# Patient Record
Sex: FEMALE | Race: OTHER | HISPANIC OR LATINO
[De-identification: names, ages, dates, MRNs, and addresses within clinical notes are randomized per-mention and may not be internally consistent; named-entity substitution may affect disease eponyms.]

---

## 2017-01-10 ENCOUNTER — APPOINTMENT (OUTPATIENT)
Dept: OTOLARYNGOLOGY | Facility: CLINIC | Age: 67
End: 2017-01-10

## 2017-01-10 VITALS
HEIGHT: 60 IN | HEART RATE: 71 BPM | BODY MASS INDEX: 33.38 KG/M2 | WEIGHT: 170 LBS | DIASTOLIC BLOOD PRESSURE: 73 MMHG | SYSTOLIC BLOOD PRESSURE: 121 MMHG

## 2017-01-10 DIAGNOSIS — H90.6 MIXED CONDUCTIVE AND SENSORINEURAL HEARING LOSS, BILATERAL: ICD-10-CM

## 2017-01-10 RX ORDER — LOSARTAN POTASSIUM 50 MG/1
50 TABLET, FILM COATED ORAL
Refills: 0 | Status: ACTIVE | COMMUNITY

## 2017-01-25 ENCOUNTER — APPOINTMENT (OUTPATIENT)
Dept: PHARMACY | Facility: CLINIC | Age: 67
End: 2017-01-25

## 2017-02-21 ENCOUNTER — APPOINTMENT (OUTPATIENT)
Dept: OTOLARYNGOLOGY | Facility: CLINIC | Age: 67
End: 2017-02-21

## 2017-03-13 ENCOUNTER — EMERGENCY (EMERGENCY)
Facility: HOSPITAL | Age: 67
LOS: 1 days | Discharge: HOME | End: 2017-03-13
Attending: EMERGENCY MEDICINE | Admitting: EMERGENCY MEDICINE
Payer: MEDICARE

## 2017-03-13 VITALS
HEART RATE: 73 BPM | DIASTOLIC BLOOD PRESSURE: 62 MMHG | RESPIRATION RATE: 16 BRPM | SYSTOLIC BLOOD PRESSURE: 125 MMHG | TEMPERATURE: 99 F | OXYGEN SATURATION: 100 %

## 2017-03-13 PROCEDURE — 99284 EMERGENCY DEPT VISIT MOD MDM: CPT | Mod: GC

## 2017-03-13 NOTE — ED ADULT TRIAGE NOTE - CHIEF COMPLAINT QUOTE
Pt arrives w/ c/o SOB since last weekend accompanied w/ FERNANDEZ. Provided w/ inhalers last weekend w/ no relief. Respirations appear even and unlabored.

## 2017-03-14 VITALS
OXYGEN SATURATION: 99 % | HEART RATE: 80 BPM | RESPIRATION RATE: 16 BRPM | DIASTOLIC BLOOD PRESSURE: 65 MMHG | SYSTOLIC BLOOD PRESSURE: 121 MMHG | TEMPERATURE: 98 F

## 2017-03-14 LAB
ALBUMIN SERPL ELPH-MCNC: 4.3 G/DL — SIGNIFICANT CHANGE UP (ref 3.3–5)
ALP SERPL-CCNC: 69 U/L — SIGNIFICANT CHANGE UP (ref 40–120)
ALT FLD-CCNC: 30 U/L — SIGNIFICANT CHANGE UP (ref 4–33)
APTT BLD: 25.5 SEC — LOW (ref 27.5–37.4)
AST SERPL-CCNC: 29 U/L — SIGNIFICANT CHANGE UP (ref 4–32)
BASOPHILS # BLD AUTO: 0.01 K/UL — SIGNIFICANT CHANGE UP (ref 0–0.2)
BASOPHILS NFR BLD AUTO: 0.1 % — SIGNIFICANT CHANGE UP (ref 0–2)
BILIRUB SERPL-MCNC: 0.4 MG/DL — SIGNIFICANT CHANGE UP (ref 0.2–1.2)
BUN SERPL-MCNC: 26 MG/DL — HIGH (ref 7–23)
CALCIUM SERPL-MCNC: 9.4 MG/DL — SIGNIFICANT CHANGE UP (ref 8.4–10.5)
CHLORIDE SERPL-SCNC: 102 MMOL/L — SIGNIFICANT CHANGE UP (ref 98–107)
CK MB BLD-MCNC: 2 — SIGNIFICANT CHANGE UP (ref 0–2.5)
CK MB BLD-MCNC: 5.58 NG/ML — HIGH (ref 1–4.7)
CK MB BLD-MCNC: 6.72 NG/ML — HIGH (ref 1–4.7)
CK SERPL-CCNC: 251 U/L — HIGH (ref 25–170)
CK SERPL-CCNC: 330 U/L — HIGH (ref 25–170)
CO2 SERPL-SCNC: 23 MMOL/L — SIGNIFICANT CHANGE UP (ref 22–31)
CREAT SERPL-MCNC: 1.04 MG/DL — SIGNIFICANT CHANGE UP (ref 0.5–1.3)
EOSINOPHIL # BLD AUTO: 0.01 K/UL — SIGNIFICANT CHANGE UP (ref 0–0.5)
EOSINOPHIL NFR BLD AUTO: 0.1 % — SIGNIFICANT CHANGE UP (ref 0–6)
GLUCOSE SERPL-MCNC: 173 MG/DL — HIGH (ref 70–99)
HCT VFR BLD CALC: 38.7 % — SIGNIFICANT CHANGE UP (ref 34.5–45)
HGB BLD-MCNC: 12.8 G/DL — SIGNIFICANT CHANGE UP (ref 11.5–15.5)
IMM GRANULOCYTES NFR BLD AUTO: 0.4 % — SIGNIFICANT CHANGE UP (ref 0–1.5)
INR BLD: 0.98 — SIGNIFICANT CHANGE UP (ref 0.87–1.18)
LYMPHOCYTES # BLD AUTO: 1.49 K/UL — SIGNIFICANT CHANGE UP (ref 1–3.3)
LYMPHOCYTES # BLD AUTO: 14.2 % — SIGNIFICANT CHANGE UP (ref 13–44)
MCHC RBC-ENTMCNC: 29.3 PG — SIGNIFICANT CHANGE UP (ref 27–34)
MCHC RBC-ENTMCNC: 33.1 % — SIGNIFICANT CHANGE UP (ref 32–36)
MCV RBC AUTO: 88.6 FL — SIGNIFICANT CHANGE UP (ref 80–100)
MONOCYTES # BLD AUTO: 0.57 K/UL — SIGNIFICANT CHANGE UP (ref 0–0.9)
MONOCYTES NFR BLD AUTO: 5.4 % — SIGNIFICANT CHANGE UP (ref 2–14)
NEUTROPHILS # BLD AUTO: 8.39 K/UL — HIGH (ref 1.8–7.4)
NEUTROPHILS NFR BLD AUTO: 79.8 % — HIGH (ref 43–77)
PLATELET # BLD AUTO: 219 K/UL — SIGNIFICANT CHANGE UP (ref 150–400)
PMV BLD: 11.6 FL — SIGNIFICANT CHANGE UP (ref 7–13)
POTASSIUM SERPL-MCNC: 3.8 MMOL/L — SIGNIFICANT CHANGE UP (ref 3.5–5.3)
POTASSIUM SERPL-SCNC: 3.8 MMOL/L — SIGNIFICANT CHANGE UP (ref 3.5–5.3)
PROT SERPL-MCNC: 7.1 G/DL — SIGNIFICANT CHANGE UP (ref 6–8.3)
PROTHROM AB SERPL-ACNC: 11.2 SEC — SIGNIFICANT CHANGE UP (ref 10–13.1)
RBC # BLD: 4.37 M/UL — SIGNIFICANT CHANGE UP (ref 3.8–5.2)
RBC # FLD: 14.4 % — SIGNIFICANT CHANGE UP (ref 10.3–14.5)
SODIUM SERPL-SCNC: 142 MMOL/L — SIGNIFICANT CHANGE UP (ref 135–145)
TROPONIN T SERPL-MCNC: < 0.06 NG/ML — SIGNIFICANT CHANGE UP (ref 0–0.06)
TROPONIN T SERPL-MCNC: < 0.06 NG/ML — SIGNIFICANT CHANGE UP (ref 0–0.06)
WBC # BLD: 10.51 K/UL — HIGH (ref 3.8–10.5)
WBC # FLD AUTO: 10.51 K/UL — HIGH (ref 3.8–10.5)

## 2017-03-14 PROCEDURE — 71020: CPT | Mod: 26

## 2017-03-14 RX ORDER — ASPIRIN/CALCIUM CARB/MAGNESIUM 324 MG
325 TABLET ORAL ONCE
Qty: 0 | Refills: 0 | Status: COMPLETED | OUTPATIENT
Start: 2017-03-14 | End: 2017-03-14

## 2017-03-14 RX ORDER — SODIUM CHLORIDE 9 MG/ML
3 INJECTION INTRAMUSCULAR; INTRAVENOUS; SUBCUTANEOUS ONCE
Qty: 0 | Refills: 0 | Status: COMPLETED | OUTPATIENT
Start: 2017-03-14 | End: 2017-03-14

## 2017-03-14 RX ORDER — IPRATROPIUM/ALBUTEROL SULFATE 18-103MCG
3 AEROSOL WITH ADAPTER (GRAM) INHALATION ONCE
Qty: 0 | Refills: 0 | Status: COMPLETED | OUTPATIENT
Start: 2017-03-14 | End: 2017-03-14

## 2017-03-14 RX ADMIN — Medication 80 MILLIGRAM(S): at 02:43

## 2017-03-14 RX ADMIN — Medication 325 MILLIGRAM(S): at 01:16

## 2017-03-14 RX ADMIN — SODIUM CHLORIDE 3 MILLILITER(S): 9 INJECTION INTRAMUSCULAR; INTRAVENOUS; SUBCUTANEOUS at 01:03

## 2017-03-14 RX ADMIN — Medication 3 MILLILITER(S): at 01:16

## 2017-03-14 NOTE — ED PROVIDER NOTE - OBJECTIVE STATEMENT
65 y/o female with PMH of asthma and HTN p/w 3 days of dry cough and SOB. Saw PCP on Saturday was prescribed Advair, prednisone and Advair advised to go to ED if not feeling better. 65 y/o female with PMH of asthma and HTN p/w 3 days of persistent dry cough and SOB. Saw PCP on Saturday was prescribed Advair, prednisone and Advair advised to go to ED if not feeling better. No CP. No fever.

## 2017-03-14 NOTE — ED ADULT NURSE NOTE - OBJECTIVE STATEMENT
Received pt in spot 4. [id number]. C/o left rib pain and sob with dry cough x 1 week. Saw pmd and prescribed medication with no relief. Denies chest pain. NSR on cardiac monitor. IV placed, labs sent. Rpt given to primary RN Srinivas. Will monitor.

## 2017-03-14 NOTE — ED ADULT NURSE REASSESSMENT NOTE - NS ED NURSE REASSESS COMMENT FT1
Patient sleeping in bed and appears to be in no apparent distress.  2nd CE drawn and sent.  Vitals taken and will continue to monitor patient.  Daughter at bedside.

## 2017-03-14 NOTE — ED PROVIDER NOTE - MEDICAL DECISION MAKING DETAILS
h/o asthma p/w cough and dyspnea for 3 days: CXR r/o PNA. Duoneb + Steroid. Check EKG and cardiac enzymes given intermittent right lateral CP. Well's negative for PE.

## 2017-03-15 ENCOUNTER — APPOINTMENT (OUTPATIENT)
Dept: OTOLARYNGOLOGY | Facility: CLINIC | Age: 67
End: 2017-03-15

## 2018-10-07 ENCOUNTER — EMERGENCY (EMERGENCY)
Facility: HOSPITAL | Age: 68
LOS: 1 days | Discharge: HOME | End: 2018-10-07
Attending: EMERGENCY MEDICINE | Admitting: EMERGENCY MEDICINE
Payer: MEDICARE

## 2018-10-07 VITALS
TEMPERATURE: 98 F | RESPIRATION RATE: 16 BRPM | HEART RATE: 69 BPM | SYSTOLIC BLOOD PRESSURE: 136 MMHG | DIASTOLIC BLOOD PRESSURE: 87 MMHG | OXYGEN SATURATION: 100 %

## 2018-10-07 VITALS
OXYGEN SATURATION: 100 % | DIASTOLIC BLOOD PRESSURE: 63 MMHG | RESPIRATION RATE: 16 BRPM | TEMPERATURE: 99 F | SYSTOLIC BLOOD PRESSURE: 110 MMHG | HEART RATE: 72 BPM

## 2018-10-07 PROBLEM — J45.909 UNSPECIFIED ASTHMA, UNCOMPLICATED: Chronic | Status: ACTIVE | Noted: 2017-03-14

## 2018-10-07 PROCEDURE — 99283 EMERGENCY DEPT VISIT LOW MDM: CPT | Mod: GC,25

## 2018-10-07 RX ADMIN — Medication 100 MILLIGRAM(S): at 03:59

## 2018-10-07 RX ADMIN — Medication 200 MILLIGRAM(S): at 04:17

## 2018-10-07 NOTE — ED PROVIDER NOTE - CARE PLAN
Principal Discharge DX:	Cough Principal Discharge DX:	Cough  Assessment and plan of treatment:	1) Please follow-up with your primary care doctor within the next 3 days.  Please call today or tomorrow for an appointment.  If you cannot follow-up with your doctor(s), please return to the ED for any urgent issues.  2) If you have any worsening of symptoms or any other concerns please return to the ED immediately.  3) Please continue taking your home medications as directed.  4) You may have been given a copy of your labs and/or imaging.  Please go over these with your primary care doctor.

## 2018-10-07 NOTE — ED PROVIDER NOTE - CHIEF COMPLAINT
The patient is a 68y Female complaining of asthma exacerbation. The patient is a 68y Female complaining of cough.

## 2018-10-07 NOTE — ED PROVIDER NOTE - PHYSICAL EXAMINATION
Vitals: WNL  Gen: actively coughing, sitting comfortably in NAD  Head: NCAT, no sinus tenderness  ENT: sclerae white, anicterus, moist mucous membranes. No exudates. Neck supple, no LAD, no posterior pharynx erythema  CV: RRR. Audible S1 and S2. No murmurs, rubs, gallops, S3, nor S4,  Pulm: Clear to auscultation bilaterally. No wheezes, rales, or rhonchi  Abd: soft, normoactive BS x4, NTND, no rebound, no guarding, no rashes  Musculoskeletal:  No peripheral edema  Skin: no lesions or scars noted  Neurologic: AAOx3  : no CVA tenderness

## 2018-10-07 NOTE — ED PROVIDER NOTE - MEDICAL DECISION MAKING DETAILS
67yo F h/o asthma p/w cough. Bronchitis s/p recent URI v postnasal drip. exam unremarkable. no concern for asthma exacerbation at this time. tessalon pearles, guafenisen, d/c with pulm f/u for further asthma management.

## 2018-10-07 NOTE — ED ADULT TRIAGE NOTE - CHIEF COMPLAINT QUOTE
pt amb to triage c/o diff breathing 2/2 asthma x 2 weeks associated w/ chest discomfort, used rescue inhaler today x 2 w/ minimal relief, able to complete sentences, dry cough noted, denies past cardiac history

## 2018-10-07 NOTE — ED ADULT NURSE NOTE - OBJECTIVE STATEMENT
pt received alert and oriented x3 . Pmhx asthma. pt c.o having productive cough with, difficulty breathing, and chest discomfort for weeks. pt weel to urgent care last week for same cc and was sent home on augmentin and returns today because she completed abx and had no improvements. respirations equal and unlabored. pt 100% on room air. pt nad. Call bell in reach, warm blanket provided, bed in lowest position, side rails up x2,safety maintained. will continue to monitor.

## 2018-10-07 NOTE — ED PROVIDER NOTE - ATTENDING CONTRIBUTION TO CARE
[place]: 68 yof with cough not resolving for few weeks, just finished antibiotics today.  Using nebulizer once a daily for sxs.  No cp, no sob.  no fever.  +congestion.  Pt appears well appearing, clear lungs, oropharynx very small but clear, no lad, nml cardiac.  no wheeze. will treat sx due to reactive airway or post nasal drip.

## 2018-10-07 NOTE — ED PROVIDER NOTE - NS ED ROS FT
Constitutional: no fevers, chills  HEENT: no visual changes, no sore throat, no rhinorrhea  CV: no cp  Resp: +cough, + sob  GI: no abd pain, n/v, diarrhea/constipation  : no dysuria, hematuria  MSK: no joint pains  skin: no rashes  neuro: no HA, no confusion

## 2018-10-07 NOTE — ED PROVIDER NOTE - OBJECTIVE STATEMENT
67yo F h/o asthma p/w cough. Reports has had cough x2wks with 69yo F h/o asthma p/w cough. Reports has had cough x2wks with productive white sputum. Was initially seen at urgent care who rx her amoxicillin. pt did not get xray there. Pt reports cough has not improved. intermittently sob. 1 episode of asthma attack earlier today which improved with her inhaler and spiriva. Pt came to ED because she would like something to help with the cough and is having post-tussive emesis. + rhinorrhea. +chest tightness when coughing. Denies f/c. Sick contacts include all of family who have URI. Has 1-2 asthma exacerbations a week, doesn't have a pulmonologist to f/u with.

## 2019-04-30 NOTE — ED ADULT NURSE NOTE - HARM RISK FACTORS

## 2020-09-29 ENCOUNTER — EMERGENCY (EMERGENCY)
Facility: HOSPITAL | Age: 70
LOS: 1 days | Discharge: HOME | End: 2020-09-29
Attending: EMERGENCY MEDICINE | Admitting: EMERGENCY MEDICINE
Payer: MEDICARE

## 2020-09-29 VITALS
OXYGEN SATURATION: 100 % | HEART RATE: 75 BPM | DIASTOLIC BLOOD PRESSURE: 88 MMHG | TEMPERATURE: 98 F | RESPIRATION RATE: 18 BRPM | SYSTOLIC BLOOD PRESSURE: 140 MMHG

## 2020-09-29 VITALS
RESPIRATION RATE: 18 BRPM | OXYGEN SATURATION: 100 % | DIASTOLIC BLOOD PRESSURE: 70 MMHG | HEART RATE: 75 BPM | SYSTOLIC BLOOD PRESSURE: 165 MMHG

## 2020-09-29 LAB
ALBUMIN SERPL ELPH-MCNC: 4.8 G/DL — SIGNIFICANT CHANGE UP (ref 3.3–5)
ALP SERPL-CCNC: 87 U/L — SIGNIFICANT CHANGE UP (ref 40–120)
ALT FLD-CCNC: 27 U/L — SIGNIFICANT CHANGE UP (ref 4–33)
ANION GAP SERPL CALC-SCNC: 16 MMO/L — HIGH (ref 7–14)
AST SERPL-CCNC: 25 U/L — SIGNIFICANT CHANGE UP (ref 4–32)
BASOPHILS # BLD AUTO: 0.03 K/UL — SIGNIFICANT CHANGE UP (ref 0–0.2)
BASOPHILS NFR BLD AUTO: 0.4 % — SIGNIFICANT CHANGE UP (ref 0–2)
BILIRUB SERPL-MCNC: 0.6 MG/DL — SIGNIFICANT CHANGE UP (ref 0.2–1.2)
BUN SERPL-MCNC: 20 MG/DL — SIGNIFICANT CHANGE UP (ref 7–23)
CALCIUM SERPL-MCNC: 10.3 MG/DL — SIGNIFICANT CHANGE UP (ref 8.4–10.5)
CHLORIDE SERPL-SCNC: 105 MMOL/L — SIGNIFICANT CHANGE UP (ref 98–107)
CO2 SERPL-SCNC: 19 MMOL/L — LOW (ref 22–31)
CREAT SERPL-MCNC: 0.83 MG/DL — SIGNIFICANT CHANGE UP (ref 0.5–1.3)
EOSINOPHIL # BLD AUTO: 0.01 K/UL — SIGNIFICANT CHANGE UP (ref 0–0.5)
EOSINOPHIL NFR BLD AUTO: 0.1 % — SIGNIFICANT CHANGE UP (ref 0–6)
GLUCOSE SERPL-MCNC: 158 MG/DL — HIGH (ref 70–99)
HCT VFR BLD CALC: 40.7 % — SIGNIFICANT CHANGE UP (ref 34.5–45)
HGB BLD-MCNC: 13.7 G/DL — SIGNIFICANT CHANGE UP (ref 11.5–15.5)
IMM GRANULOCYTES NFR BLD AUTO: 0.3 % — SIGNIFICANT CHANGE UP (ref 0–1.5)
LYMPHOCYTES # BLD AUTO: 0.92 K/UL — LOW (ref 1–3.3)
LYMPHOCYTES # BLD AUTO: 12.3 % — LOW (ref 13–44)
MCHC RBC-ENTMCNC: 29 PG — SIGNIFICANT CHANGE UP (ref 27–34)
MCHC RBC-ENTMCNC: 33.7 % — SIGNIFICANT CHANGE UP (ref 32–36)
MCV RBC AUTO: 86 FL — SIGNIFICANT CHANGE UP (ref 80–100)
MONOCYTES # BLD AUTO: 0.09 K/UL — SIGNIFICANT CHANGE UP (ref 0–0.9)
MONOCYTES NFR BLD AUTO: 1.2 % — LOW (ref 2–14)
NEUTROPHILS # BLD AUTO: 6.43 K/UL — SIGNIFICANT CHANGE UP (ref 1.8–7.4)
NEUTROPHILS NFR BLD AUTO: 85.7 % — HIGH (ref 43–77)
NRBC # FLD: 0 K/UL — SIGNIFICANT CHANGE UP (ref 0–0)
PLATELET # BLD AUTO: 222 K/UL — SIGNIFICANT CHANGE UP (ref 150–400)
PMV BLD: 11.8 FL — SIGNIFICANT CHANGE UP (ref 7–13)
POTASSIUM SERPL-MCNC: 3.8 MMOL/L — SIGNIFICANT CHANGE UP (ref 3.5–5.3)
POTASSIUM SERPL-SCNC: 3.8 MMOL/L — SIGNIFICANT CHANGE UP (ref 3.5–5.3)
PROT SERPL-MCNC: 7.8 G/DL — SIGNIFICANT CHANGE UP (ref 6–8.3)
RBC # BLD: 4.73 M/UL — SIGNIFICANT CHANGE UP (ref 3.8–5.2)
RBC # FLD: 13.1 % — SIGNIFICANT CHANGE UP (ref 10.3–14.5)
SARS-COV-2 RNA SPEC QL NAA+PROBE: SIGNIFICANT CHANGE UP
SODIUM SERPL-SCNC: 140 MMOL/L — SIGNIFICANT CHANGE UP (ref 135–145)
TROPONIN T, HIGH SENSITIVITY: 7 NG/L — SIGNIFICANT CHANGE UP (ref ?–14)
TROPONIN T, HIGH SENSITIVITY: 7 NG/L — SIGNIFICANT CHANGE UP (ref ?–14)
WBC # BLD: 7.5 K/UL — SIGNIFICANT CHANGE UP (ref 3.8–10.5)
WBC # FLD AUTO: 7.5 K/UL — SIGNIFICANT CHANGE UP (ref 3.8–10.5)

## 2020-09-29 PROCEDURE — 71045 X-RAY EXAM CHEST 1 VIEW: CPT | Mod: 26

## 2020-09-29 PROCEDURE — 99283 EMERGENCY DEPT VISIT LOW MDM: CPT | Mod: CS

## 2020-09-29 RX ORDER — ALBUTEROL 90 UG/1
4 AEROSOL, METERED ORAL ONCE
Refills: 0 | Status: COMPLETED | OUTPATIENT
Start: 2020-09-29 | End: 2020-09-29

## 2020-09-29 RX ORDER — ACETAMINOPHEN 500 MG
975 TABLET ORAL ONCE
Refills: 0 | Status: COMPLETED | OUTPATIENT
Start: 2020-09-29 | End: 2020-09-29

## 2020-09-29 RX ADMIN — Medication 975 MILLIGRAM(S): at 03:13

## 2020-09-29 RX ADMIN — Medication 100 MILLIGRAM(S): at 03:13

## 2020-09-29 RX ADMIN — ALBUTEROL 4 PUFF(S): 90 AEROSOL, METERED ORAL at 03:13

## 2020-09-29 NOTE — ED PROVIDER NOTE - OBJECTIVE STATEMENT
HPI: Patient is a 70 y.o female with PMHx of Asthma and htn who presents to ED c/o episode of sob tonight a/w dry cough x 3 days. Pt states she has seasonal allergies and notes that for past 3 days she has had a dry cough. States she went to her PCP yesterday who prescribed her a medrol dose thania and zithromax. Pt states she took 1st dose of steroid and then went to sleep. States she then woke up and felt sob as if she could not get a good breath. Pt then told her  to call 911. EMS arrived and started her on 2 L NC. Pt notes some chest discomfort but states only with coughing. Pt denies sick contacts, fevers, chills, known covid exposure, dizziness, weakness, headache, pleuritic pain, LE pain/swelling, n/v/d, LE swelling or any other complaints.

## 2020-09-29 NOTE — ED PROVIDER NOTE - NSFOLLOWUPINSTRUCTIONS_ED_ALL_ED_FT
Patient advised to follow up with PRIMARY CARE DOCTOR IN 1-2 DAYS AND PULMONOLOGIST WITHIN 1-2 WEEKS and told to return to the emergency department immediately for any new or concerning symptoms OR ANY OTHER COMPLAINTS. Patient agrees with plan.    Continue antibiotic zithromax  Continue steroid as directed   Use albuterol inhaler (rescue inhaler) 2 puffs every 4-6 hours as needed for shortness of breath      Continue all previously prescribed medications as directed unless otherwise instructed.  Follow up with your primary care physician in 48-72 hours- bring copies of your results.  Return to the ER for worsening or persistent symptoms, and/or ANY NEW OR CONCERNING SYMPTOMS. If you have issues obtaining follow up, please call: [phone] to obtain a doctor or specialist who takes your insurance in your area.  You may call [phone] to make an appointment with the internal medicine clinic.

## 2020-09-29 NOTE — ED ADULT NURSE NOTE - OBJECTIVE STATEMENT
Pt is a 70 year old female reporting to the ED for SOB and cough for 3 days. pmh of asthma. Pt reports she saw PCP for asthma yesterday and given prednisone. Pt woke up tonight short of breath and did not use albuterol inhaler. Pt reports sob similar ot asthma exacerbations in the past. Pt is AOX4. Pt denies chest pain . PT respirations even an unlabored. spo2 100 % on room air. Pt appears to be comfortable, in NAD. Pt denies fever, chills, n/v/d. Pt denies abdominal pain, dysuria, hematuria. 18 g iv place din left wrist, labs drawn, pending review, will continue to monitor.

## 2020-09-29 NOTE — ED PROVIDER NOTE - PROGRESS NOTE DETAILS
PA Rascon- Labs and CXR wnl. Trop neg x 2. Pt vitals stable. All questions and concerns addressed. Strict return instructions given. No complaints noted. Pt advised to f/u with her PCP and pulmonologist. Advised to isolate herself for at least 1 week until sx resolve.

## 2020-09-29 NOTE — ED PROVIDER NOTE - CLINICAL SUMMARY MEDICAL DECISION MAKING FREE TEXT BOX
Patient is a 70 y.o female with PMHx of Asthma and htn who presents to ED c/o episode of sob tonight a/w dry cough x 3 days.  DDx- bronchitis, asthma exacerbation, r/o covid. Low suspicion for acs but will get trop and ecg.

## 2020-09-29 NOTE — ED PROVIDER NOTE - PATIENT PORTAL LINK FT
negative You can access the FollowMyHealth Patient Portal offered by [facility] by registering at the following website: [email or web address]. By joining [email or web address]’s FollowMyHealth portal, you will also be able to view your health information using other applications (apps) compatible with our system. detailed exam mouth

## 2020-09-29 NOTE — ED ADULT TRIAGE NOTE - CHIEF COMPLAINT QUOTE
Pt arrives to ED via EMS c/o cough and SOB for the last 48 hours.  Pt has hx of asthma.  Pt saw PMD yesterday and started Prednisone.  Pt was afraid to use her nebulizer treatment because she started the steroid.  Pt does not have a rescue inhaler.  EMS reports no wheezing.  Pt arrives on 2L Oxygen via nasal cannula from EMS.  Pt able speak in complete sentences.

## 2020-09-29 NOTE — ED PROVIDER NOTE - PHYSICAL EXAMINATION
Vital signs reviewed.   CONSTITUTIONAL: Well-appearing; well-nourished; in no apparent distress. Non-toxic appearing.   HEAD: Normocephalic, atraumatic.  EYES: PERRL, EOM intact, conjunctiva and sclera WNL.  ENT: normal nose; no rhinorrhea; normal pharynx with no tonsillar hypertrophy, no erythema, no exudate, no lymphadenopathy.  NECK:Trachea midline   CARD: Normal S1, S2; no murmurs, rubs, or gallops noted.  RESP: Normal chest excursion with respiration; breath sounds clear and equal bilaterally; no wheezes, rhonchi, or rales.  EXT/MS: moves all extremities, no pedal edema.  SKIN: Normal for age and race; warm; dry; good turgor; no apparent lesions or exudate noted.  NEURO: Awake, alert, oriented x 3, no gross deficits  PSYCH: Normal mood; appropriate affect.

## 2020-09-29 NOTE — ED PROVIDER NOTE - ATTENDING CONTRIBUTION TO CARE
Afebrile. Awake and Alert. Lungs CTA. Heart RRR. Abdomen soft NTND. CN II-XII grossly intact. Moves all extremities without lateralization.    r/o asthma: no wheezing  Well's negative  r/o infection  No chest pain, EKG non-ischemic

## 2020-11-01 ENCOUNTER — EMERGENCY (EMERGENCY)
Facility: HOSPITAL | Age: 70
LOS: 1 days | Discharge: HOME | End: 2020-11-01
Attending: EMERGENCY MEDICINE | Admitting: EMERGENCY MEDICINE
Payer: MEDICARE

## 2020-11-01 VITALS
DIASTOLIC BLOOD PRESSURE: 91 MMHG | HEART RATE: 72 BPM | OXYGEN SATURATION: 97 % | RESPIRATION RATE: 16 BRPM | TEMPERATURE: 96 F | SYSTOLIC BLOOD PRESSURE: 165 MMHG

## 2020-11-01 LAB
ALBUMIN SERPL ELPH-MCNC: 4.6 G/DL — SIGNIFICANT CHANGE UP (ref 3.3–5)
ALP SERPL-CCNC: 65 U/L — SIGNIFICANT CHANGE UP (ref 40–120)
ALT FLD-CCNC: 22 U/L — SIGNIFICANT CHANGE UP (ref 4–33)
ANION GAP SERPL CALC-SCNC: 12 MMO/L — SIGNIFICANT CHANGE UP (ref 7–14)
AST SERPL-CCNC: 20 U/L — SIGNIFICANT CHANGE UP (ref 4–32)
B PERT DNA SPEC QL NAA+PROBE: SIGNIFICANT CHANGE UP
BASE EXCESS BLDV CALC-SCNC: 0.3 MMOL/L — SIGNIFICANT CHANGE UP
BASOPHILS # BLD AUTO: 0.02 K/UL — SIGNIFICANT CHANGE UP (ref 0–0.2)
BASOPHILS NFR BLD AUTO: 0.2 % — SIGNIFICANT CHANGE UP (ref 0–2)
BILIRUB SERPL-MCNC: 0.6 MG/DL — SIGNIFICANT CHANGE UP (ref 0.2–1.2)
BLOOD GAS VENOUS - CREATININE: 1.11 MG/DL — SIGNIFICANT CHANGE UP (ref 0.5–1.3)
BLOOD GAS VENOUS - FIO2: 21 — SIGNIFICANT CHANGE UP
BUN SERPL-MCNC: 16 MG/DL — SIGNIFICANT CHANGE UP (ref 7–23)
C PNEUM DNA SPEC QL NAA+PROBE: SIGNIFICANT CHANGE UP
CALCIUM SERPL-MCNC: 10 MG/DL — SIGNIFICANT CHANGE UP (ref 8.4–10.5)
CHLORIDE BLDV-SCNC: 108 MMOL/L — SIGNIFICANT CHANGE UP (ref 96–108)
CHLORIDE SERPL-SCNC: 104 MMOL/L — SIGNIFICANT CHANGE UP (ref 98–107)
CO2 SERPL-SCNC: 23 MMOL/L — SIGNIFICANT CHANGE UP (ref 22–31)
CREAT SERPL-MCNC: 0.94 MG/DL — SIGNIFICANT CHANGE UP (ref 0.5–1.3)
EOSINOPHIL # BLD AUTO: 0.01 K/UL — SIGNIFICANT CHANGE UP (ref 0–0.5)
EOSINOPHIL NFR BLD AUTO: 0.1 % — SIGNIFICANT CHANGE UP (ref 0–6)
FLUAV H1 2009 PAND RNA SPEC QL NAA+PROBE: SIGNIFICANT CHANGE UP
FLUAV H1 RNA SPEC QL NAA+PROBE: SIGNIFICANT CHANGE UP
FLUAV H3 RNA SPEC QL NAA+PROBE: SIGNIFICANT CHANGE UP
FLUAV SUBTYP SPEC NAA+PROBE: SIGNIFICANT CHANGE UP
FLUBV RNA SPEC QL NAA+PROBE: SIGNIFICANT CHANGE UP
GAS PNL BLDV: 144 MMOL/L — SIGNIFICANT CHANGE UP (ref 136–146)
GLUCOSE BLDV-MCNC: 102 MG/DL — HIGH (ref 70–99)
GLUCOSE SERPL-MCNC: 105 MG/DL — HIGH (ref 70–99)
HADV DNA SPEC QL NAA+PROBE: SIGNIFICANT CHANGE UP
HCO3 BLDV-SCNC: 23 MMOL/L — SIGNIFICANT CHANGE UP (ref 20–27)
HCOV PNL SPEC NAA+PROBE: SIGNIFICANT CHANGE UP
HCT VFR BLD CALC: 39.6 % — SIGNIFICANT CHANGE UP (ref 34.5–45)
HCT VFR BLDV CALC: 41.2 % — SIGNIFICANT CHANGE UP (ref 34.5–45)
HGB BLD-MCNC: 12.7 G/DL — SIGNIFICANT CHANGE UP (ref 11.5–15.5)
HGB BLDV-MCNC: 13.4 G/DL — SIGNIFICANT CHANGE UP (ref 11.5–15.5)
HMPV RNA SPEC QL NAA+PROBE: SIGNIFICANT CHANGE UP
HPIV1 RNA SPEC QL NAA+PROBE: SIGNIFICANT CHANGE UP
HPIV2 RNA SPEC QL NAA+PROBE: SIGNIFICANT CHANGE UP
HPIV3 RNA SPEC QL NAA+PROBE: SIGNIFICANT CHANGE UP
HPIV4 RNA SPEC QL NAA+PROBE: SIGNIFICANT CHANGE UP
IMM GRANULOCYTES NFR BLD AUTO: 0.2 % — SIGNIFICANT CHANGE UP (ref 0–1.5)
LACTATE BLDV-MCNC: 2.3 MMOL/L — HIGH (ref 0.5–2)
LYMPHOCYTES # BLD AUTO: 1.96 K/UL — SIGNIFICANT CHANGE UP (ref 1–3.3)
LYMPHOCYTES # BLD AUTO: 22.5 % — SIGNIFICANT CHANGE UP (ref 13–44)
MCHC RBC-ENTMCNC: 28.9 PG — SIGNIFICANT CHANGE UP (ref 27–34)
MCHC RBC-ENTMCNC: 32.1 % — SIGNIFICANT CHANGE UP (ref 32–36)
MCV RBC AUTO: 90 FL — SIGNIFICANT CHANGE UP (ref 80–100)
MONOCYTES # BLD AUTO: 0.57 K/UL — SIGNIFICANT CHANGE UP (ref 0–0.9)
MONOCYTES NFR BLD AUTO: 6.5 % — SIGNIFICANT CHANGE UP (ref 2–14)
NEUTROPHILS # BLD AUTO: 6.15 K/UL — SIGNIFICANT CHANGE UP (ref 1.8–7.4)
NEUTROPHILS NFR BLD AUTO: 70.5 % — SIGNIFICANT CHANGE UP (ref 43–77)
NRBC # FLD: 0 K/UL — SIGNIFICANT CHANGE UP (ref 0–0)
PCO2 BLDV: 37 MMHG — LOW (ref 41–51)
PH BLDV: 7.43 PH — SIGNIFICANT CHANGE UP (ref 7.32–7.43)
PLATELET # BLD AUTO: 232 K/UL — SIGNIFICANT CHANGE UP (ref 150–400)
PMV BLD: 11.6 FL — SIGNIFICANT CHANGE UP (ref 7–13)
PO2 BLDV: < 24 MMHG — LOW (ref 35–40)
POTASSIUM BLDV-SCNC: 3.9 MMOL/L — SIGNIFICANT CHANGE UP (ref 3.4–4.5)
POTASSIUM SERPL-MCNC: 3.8 MMOL/L — SIGNIFICANT CHANGE UP (ref 3.5–5.3)
POTASSIUM SERPL-SCNC: 3.8 MMOL/L — SIGNIFICANT CHANGE UP (ref 3.5–5.3)
PROT SERPL-MCNC: 7 G/DL — SIGNIFICANT CHANGE UP (ref 6–8.3)
RAPID RVP RESULT: SIGNIFICANT CHANGE UP
RBC # BLD: 4.4 M/UL — SIGNIFICANT CHANGE UP (ref 3.8–5.2)
RBC # FLD: 13.5 % — SIGNIFICANT CHANGE UP (ref 10.3–14.5)
RSV RNA SPEC QL NAA+PROBE: SIGNIFICANT CHANGE UP
RV+EV RNA SPEC QL NAA+PROBE: SIGNIFICANT CHANGE UP
SAO2 % BLDV: 24.1 % — LOW (ref 60–85)
SARS-COV-2 RNA SPEC QL NAA+PROBE: SIGNIFICANT CHANGE UP
SODIUM SERPL-SCNC: 139 MMOL/L — SIGNIFICANT CHANGE UP (ref 135–145)
TROPONIN T, HIGH SENSITIVITY: 10 NG/L — SIGNIFICANT CHANGE UP (ref ?–14)
WBC # BLD: 8.73 K/UL — SIGNIFICANT CHANGE UP (ref 3.8–10.5)
WBC # FLD AUTO: 8.73 K/UL — SIGNIFICANT CHANGE UP (ref 3.8–10.5)

## 2020-11-01 PROCEDURE — 99284 EMERGENCY DEPT VISIT MOD MDM: CPT | Mod: CS,GC

## 2020-11-01 PROCEDURE — 71046 X-RAY EXAM CHEST 2 VIEWS: CPT | Mod: 26

## 2020-11-01 RX ORDER — ALBUTEROL 90 UG/1
2 AEROSOL, METERED ORAL ONCE
Refills: 0 | Status: COMPLETED | OUTPATIENT
Start: 2020-11-01 | End: 2020-11-01

## 2020-11-01 RX ORDER — IPRATROPIUM/ALBUTEROL SULFATE 18-103MCG
3 AEROSOL WITH ADAPTER (GRAM) INHALATION ONCE
Refills: 0 | Status: DISCONTINUED | OUTPATIENT
Start: 2020-11-01 | End: 2020-11-01

## 2020-11-01 RX ADMIN — ALBUTEROL 2 PUFF(S): 90 AEROSOL, METERED ORAL at 03:28

## 2020-11-01 NOTE — ED PROVIDER NOTE - CLINICAL SUMMARY MEDICAL DECISION MAKING FREE TEXT BOX
71yo woman PMH asthma and HTN presents with cough x 1 week with white sputum currently on steroid taper and started on azithromycin with no wheezing on exam. Symptoms likely 2/2 viral infx vs bronchitis vs pna. Low suspicion for cardiac etiology and no wheezing to suggest asthma exacerbation. Will evaluate with ekg, trop, vbg, rvp swab, cxr.

## 2020-11-01 NOTE — ED PROVIDER NOTE - OBJECTIVE STATEMENT
69yo woman PMH asthma and HTN presents with cough productive of white sputum x 1 week with SOB and chest tightness. She was evaluated by pulmonologist 5 days ago and was given tessalon perles and prednisone taper (40mg x 3 days and took 20mg x 2 days and has to continue taper). She had continued coughing and went to urgent care and was given azithromycin and took 2 tabs today. She had persistent coughing and came to the ED for evaluation. She uses wixela BID and used an albuterol nebulizer at around 8:30pm with no relief. No fevers, no n/v, no lightheadedness, no abdominal pain, no dysuria, no rash. She denies any sick contacts and has not been out and lives with her . 69yo woman PMH asthma and HTN presents with cough productive of white sputum x 1 week with SOB and chest tightness. She was evaluated by pulmonologist 5 days ago and was given tessalon perles and prednisone taper (40mg x 3 days and took 20mg x 2 days and has to continue taper). She had continued coughing and went to urgent care and was given azithromycin and took 2 tabs today. She had persistent coughing and came to the ED for evaluation. She uses wixela BID and used an albuterol nebulizer at around 8:30pm with no relief. No fevers, no n/v, no lightheadedness, no abdominal pain, no dysuria, no rash. She denies any sick contacts and has not been out and lives with her .    Attending/Meghan: 71 yo F as described above, h/o asthma, HTN p/w ~one week of min productive cough. Pt states when coughing of chest tightness and SOB however denies FERNANDEZ. Further denies fever/chills, nasal congestion, reflux symptoms, weakness or lightheadedness. Last week pt sarted on steroid taper as well as inhaled steroids.

## 2020-11-01 NOTE — ED PROVIDER NOTE - PROGRESS NOTE DETAILS
Jadyn Patel, resident MD: pt in no respiratory distress and no wheezing on exam. no consolidations on cxr to suggest PNA. pt is already on steroid taper but states she lost her prednisone yesterday and requires 4 more days in taper, will prescribe remaining 4 days of prednisone. pt also has albuterol nebs and tessalon perles at home. discussed continue therapies and that she may have cough for the next 1-2 more weeks. initial trop is 10, low suspicion for cardiac etiology. will discharge patient home at this time. printed out copies of results for patient to take home. discussed return precautions and need for outpatient follow up.

## 2020-11-01 NOTE — ED PROVIDER NOTE - PHYSICAL EXAMINATION
General: well-appearing elderly woman in no acute distress  Head: normocephalic, atraumatic  Eyes: PERRL  Mouth: moist mucous membranes, no oropharyngeal erythema or exudates  Neck: supple neck, no lymphadenopathy  CV: normal rate and rhythm, no LE edema, peripheral pulses 2+ bilateral UE and LE  Respiratory: clear to auscultation bilaterally, no wheezing or crackles  Abdomen: soft, nontender, nondistended  Neuro: alert and oriented x3, speech clear, moving all extremities without difficulty  Skin: no rash noted  Extremities: no joint deformities noted General: well-appearing elderly woman in no acute distress  Head: normocephalic, atraumatic  Eyes: PERRL  Mouth: moist mucous membranes, no oropharyngeal erythema or exudates  Neck: supple neck, no lymphadenopathy  CV: normal rate and rhythm, no LE edema, peripheral pulses 2+ bilateral UE and LE  Respiratory: clear to auscultation bilaterally, no wheezing or crackles  Abdomen: soft, nontender, nondistended  Neuro: alert and oriented x3, speech clear, moving all extremities without difficulty  Skin: no rash noted  Extremities: no joint deformities noted    Attending/Meghan: NAD; PERRL/EOMI, non-icterus, no nasal mucosa inflammation or d/c,  supple, no resp acc mm use, no LEONILA, no JVD, RRR, CTAB, good airway movement; Abd-soft, NT/ND, no HSM; no LE edema, A&Ox3, nonfocal; Skin-warm/dry/no rash

## 2020-11-01 NOTE — ED ADULT TRIAGE NOTE - CHIEF COMPLAINT QUOTE
cough and sob    pt has frequent cough occasionally expectorating large amount of white phlegm...  feels like she's wheezing.. chest tightness.  went to her pulmonologist and given mdi but feels the same.   pt is hard of eharing.. .  wears hearing aids but states they aren't fully charged.

## 2020-11-01 NOTE — ED PROVIDER NOTE - NSFOLLOWUPINSTRUCTIONS_ED_ALL_ED_FT
You were seen an evaluated in the emergency room for cough.    Please follow up with your pulmonologist.    Continue all medications as prescribed. The remainder of your steroid taper was sent to your pharmacy. Take 2 tabs today and then 1 tab for the next 3 days.    You can use your albuterol nebulizer up to every 4-6 hours.    Seek immediate medical attention for any worsening, new, or concerning symptoms.    Please read all attached.

## 2020-11-01 NOTE — ED PROVIDER NOTE - NS ED ROS FT
General: no fever  Head: no headache  Eyes: no vision change  ENT: no nasal discharge/congestion, no sore throat  CV: +chest tightness  Resp: +SOB, +cough  GI: no N/V/D, no abdominal pain  : no dysuria  MSK: no joint pain  Skin: no new rash  Neuro: no focal weakness, no change in sensation

## 2020-11-01 NOTE — ED PROVIDER NOTE - PATIENT PORTAL LINK FT
You can access the FollowMyHealth Patient Portal offered by [facility] by registering at the following website: [email or web address]. By joining [employer]’s FollowMyHealth portal, you will also be able to view your health information using other applications (apps) compatible with our system.

## 2020-12-09 ENCOUNTER — EMERGENCY (EMERGENCY)
Facility: HOSPITAL | Age: 70
LOS: 1 days | Discharge: HOME | End: 2020-12-09
Attending: EMERGENCY MEDICINE | Admitting: EMERGENCY MEDICINE
Payer: MEDICARE

## 2020-12-09 VITALS
DIASTOLIC BLOOD PRESSURE: 84 MMHG | HEART RATE: 67 BPM | RESPIRATION RATE: 19 BRPM | OXYGEN SATURATION: 100 % | TEMPERATURE: 98 F | SYSTOLIC BLOOD PRESSURE: 161 MMHG

## 2020-12-09 VITALS
DIASTOLIC BLOOD PRESSURE: 71 MMHG | SYSTOLIC BLOOD PRESSURE: 149 MMHG | TEMPERATURE: 97 F | OXYGEN SATURATION: 95 % | RESPIRATION RATE: 18 BRPM | HEART RATE: 86 BPM

## 2020-12-09 LAB
ALBUMIN SERPL ELPH-MCNC: 4.6 G/DL — SIGNIFICANT CHANGE UP (ref 3.3–5)
ALP SERPL-CCNC: 63 U/L — SIGNIFICANT CHANGE UP (ref 40–120)
ALT FLD-CCNC: 24 U/L — SIGNIFICANT CHANGE UP (ref 4–33)
ANION GAP SERPL CALC-SCNC: 19 MMOL/L — HIGH (ref 7–14)
AST SERPL-CCNC: 42 U/L — HIGH (ref 4–32)
BILIRUB SERPL-MCNC: 0.8 MG/DL — SIGNIFICANT CHANGE UP (ref 0.2–1.2)
BUN SERPL-MCNC: 18 MG/DL — SIGNIFICANT CHANGE UP (ref 7–23)
CALCIUM SERPL-MCNC: 10 MG/DL — SIGNIFICANT CHANGE UP (ref 8.4–10.5)
CHLORIDE SERPL-SCNC: 103 MMOL/L — SIGNIFICANT CHANGE UP (ref 98–107)
CO2 SERPL-SCNC: 16 MMOL/L — LOW (ref 22–31)
CREAT SERPL-MCNC: 0.93 MG/DL — SIGNIFICANT CHANGE UP (ref 0.5–1.3)
GLUCOSE SERPL-MCNC: 155 MG/DL — HIGH (ref 70–99)
HCT VFR BLD CALC: 42 % — SIGNIFICANT CHANGE UP (ref 34.5–45)
HGB BLD-MCNC: 13.7 G/DL — SIGNIFICANT CHANGE UP (ref 11.5–15.5)
MCHC RBC-ENTMCNC: 28.8 PG — SIGNIFICANT CHANGE UP (ref 27–34)
MCHC RBC-ENTMCNC: 32.6 GM/DL — SIGNIFICANT CHANGE UP (ref 32–36)
MCV RBC AUTO: 88.4 FL — SIGNIFICANT CHANGE UP (ref 80–100)
NRBC # BLD: 0 /100 WBCS — SIGNIFICANT CHANGE UP
NRBC # FLD: 0 K/UL — SIGNIFICANT CHANGE UP
PLATELET # BLD AUTO: 120 K/UL — LOW (ref 150–400)
POTASSIUM SERPL-MCNC: 4.5 MMOL/L — SIGNIFICANT CHANGE UP (ref 3.5–5.3)
POTASSIUM SERPL-SCNC: 4.5 MMOL/L — SIGNIFICANT CHANGE UP (ref 3.5–5.3)
PROT SERPL-MCNC: 7.6 G/DL — SIGNIFICANT CHANGE UP (ref 6–8.3)
RBC # BLD: 4.75 M/UL — SIGNIFICANT CHANGE UP (ref 3.8–5.2)
RBC # FLD: 13.5 % — SIGNIFICANT CHANGE UP (ref 10.3–14.5)
SODIUM SERPL-SCNC: 138 MMOL/L — SIGNIFICANT CHANGE UP (ref 135–145)
WBC # BLD: 5.59 K/UL — SIGNIFICANT CHANGE UP (ref 3.8–10.5)
WBC # FLD AUTO: 5.59 K/UL — SIGNIFICANT CHANGE UP (ref 3.8–10.5)

## 2020-12-09 PROCEDURE — 99284 EMERGENCY DEPT VISIT MOD MDM: CPT

## 2020-12-09 PROCEDURE — 71046 X-RAY EXAM CHEST 2 VIEWS: CPT | Mod: 26

## 2020-12-09 PROCEDURE — 71250 CT THORAX DX C-: CPT | Mod: 26

## 2020-12-09 NOTE — ED PROVIDER NOTE - PATIENT PORTAL LINK FT
You can access the FollowMyHealth Patient Portal offered by [facility] by registering at the following website: [email or web address]. By joining [employer]’s FollowMyHealth portal, you will also be able to view your health information using other applications (apps) compatible with our system.

## 2020-12-09 NOTE — ED ADULT NURSE NOTE - OBJECTIVE STATEMENT
70  year olf female complaining of cough, sob, and chest pain/tightness for since  last week, pt tested negative for Covid on Friday. Pt was prescribed azithromycin for 3 days by PCP. Pt denies fever, chills, fatigue, headache, urinary symptoms, loss of sense of taste and smell, diarrhea, headache. Pt endorses asthma. Pt able to speak in full sentences. Pt saturation 100%on room air.  Pt states "it doesn't feel like an asthma exacerbation".

## 2020-12-09 NOTE — ED PROVIDER NOTE - OBJECTIVE STATEMENT
70 year old female pmh asthma presents with persistent wheezing.  patient s/p azithro and currently on prednisone. patient also with cough. denies chest pain. no n/v. denies abd pain or diaphoresis.

## 2020-12-09 NOTE — ED ADULT NURSE NOTE - NSIMPLEMENTINTERV_GEN_ALL_ED
Implemented All Universal Safety Interventions:  [place] to call system. Call bell, personal items and telephone within reach. Instruct patient to call for assistance. Room bathroom lighting operational. Non-slip footwear when patient is off stretcher. Physically safe environment: no spills, clutter or unnecessary equipment. Stretcher in lowest position, wheels locked, appropriate side rails in place.

## 2020-12-09 NOTE — ED PROVIDER NOTE - CLINICAL SUMMARY MEDICAL DECISION MAKING FREE TEXT BOX
concern for asthma vs pna vs copd vs covid vs other viral syndrome .  will check labs cxr and ct chest.  patient sating well. if above neg will dc with pulm fu

## 2020-12-09 NOTE — ED ADULT TRIAGE NOTE - CHIEF COMPLAINT QUOTE
hx of asthma was started on azithro by her PMD for cough last week presents today with SOB and chest pain present on coughing and deep breathing. Tested negative for COVID last friday. normoxic and no resp distress at rest

## 2020-12-09 NOTE — ED PROVIDER NOTE - NSFOLLOWUPINSTRUCTIONS_ED_ALL_ED_FT
Finish your prednisone prescribed by your primary care doctor.      Follow up with your doctor in 3 days.    Return to the Emergency Department if you develop chest pain, high fever or shortness of breath.

## 2020-12-10 PROBLEM — I10 ESSENTIAL (PRIMARY) HYPERTENSION: Chronic | Status: ACTIVE | Noted: 2020-11-01

## 2020-12-10 LAB
B PERT DNA SPEC QL NAA+PROBE: SIGNIFICANT CHANGE UP
C PNEUM DNA SPEC QL NAA+PROBE: SIGNIFICANT CHANGE UP
FLUAV H1 2009 PAND RNA SPEC QL NAA+PROBE: SIGNIFICANT CHANGE UP
FLUAV H1 RNA SPEC QL NAA+PROBE: SIGNIFICANT CHANGE UP
FLUAV H3 RNA SPEC QL NAA+PROBE: SIGNIFICANT CHANGE UP
FLUAV SUBTYP SPEC NAA+PROBE: SIGNIFICANT CHANGE UP
FLUBV RNA SPEC QL NAA+PROBE: SIGNIFICANT CHANGE UP
HADV DNA SPEC QL NAA+PROBE: SIGNIFICANT CHANGE UP
HCOV PNL SPEC NAA+PROBE: SIGNIFICANT CHANGE UP
HMPV RNA SPEC QL NAA+PROBE: SIGNIFICANT CHANGE UP
HPIV1 RNA SPEC QL NAA+PROBE: SIGNIFICANT CHANGE UP
HPIV2 RNA SPEC QL NAA+PROBE: SIGNIFICANT CHANGE UP
HPIV3 RNA SPEC QL NAA+PROBE: SIGNIFICANT CHANGE UP
HPIV4 RNA SPEC QL NAA+PROBE: SIGNIFICANT CHANGE UP
RAPID RVP RESULT: SIGNIFICANT CHANGE UP
RSV RNA SPEC QL NAA+PROBE: SIGNIFICANT CHANGE UP
RV+EV RNA SPEC QL NAA+PROBE: SIGNIFICANT CHANGE UP
SARS-COV-2 RNA SPEC QL NAA+PROBE: SIGNIFICANT CHANGE UP

## 2021-04-24 NOTE — ED ADULT NURSE NOTE - BP NONINVASIVE DIASTOLIC (MM HG)
Progress Note    Patient: Chencho Howard Date: 4/24/2021   male, 57 year old  Admit Date: 4/20/2021   Attending: Delicia Magdaleno DO      Subjective:  Chencho Howard is a 57 year old male who is being seen in follow up for Acute renal failure superimposed on chronic kidney disease (CMS/HCC).     No acute overnight events. Per RN, patient had episode with a \"few drops\" of blood in BM yesterday afternoon. No further bleeding noted overnight and this morning patient reports he may have had some hx of hemorrhoids and straining with BMs in the past. He reports no evidence of melena, N/V, hemoptysis or blood in urine. He feels that his presenting scrotal edema and LLE edeam are overall better. He otherwise denies any new complaints.            Medications: Personally reviewed today in this patient's active orders section of Epic    Allergies:   Allergies as of 04/20/2021 - Reviewed 04/20/2021   Allergen Reaction Noted   • Lisinopril Cough and Other (See Comments) 01/20/2017   • Metformin DIARRHEA 02/14/2017       PHYSICAL EXAM:  Visit Vitals  BP (!) 147/74 (BP Location: RUE - Right upper extremity, Patient Position: Semi-Palumbo's)   Pulse 68   Temp 98.3 °F (36.8 °C) (Oral)   Resp 17   Ht 5' 11\" (1.803 m)   Wt (!) 153.3 kg   SpO2 90%   BMI 47.14 kg/m²     General: Alert and oriented x 3, seated in bed, in no acute distress  Lungs: No increased work of breathing, lungs clear to auscultation bilaterally   Heart:  RRR, S1/S2  Abdomen: Obese, soft, non-tender to palpation  Extremities: Well healed R BKA, 2+ LLE edema with venous stasis dermatitis, wounds covered with clean, dry gauze  Neurologic:  CN 2-12 grossly intact as best as patient can cooperate with exam    Labs:  Recent Labs   Lab 04/24/21  0625 04/23/21  0625 04/22/21  0505   WBC 7.3 7.4 7.6   RBC 3.96* 3.82* 3.82*   HGB 9.5* 9.3* 9.3*   HCT 32.3* 31.3* 32.2*    229 238   SEG 76 75 76     Recent Labs   Lab 04/24/21  0625 04/23/21  0625 04/22/21  0504  04/21/21  0805 04/21/21  0733 04/20/21  1551   SODIUM 136 139 140  --  142 141   POTASSIUM 4.3 4.4 4.7  --  4.9 4.8   CHLORIDE 104 107 111*  --  114* 113*   CO2 29 28 25  --  23 22   BUN 36* 53* 70*  --  88* 85*   CREATININE 3.07* 3.59* 4.44*  --  5.10* 5.02*   GLUCOSE 120* 107* 124*  --  120* 136*   CALCIUM 7.9* 8.5 7.8*  --  8.3* 8.1*   ALBUMIN  --   --   --   --  2.6* 2.8*   AST  --   --   --   --  11 13   GPT  --   --   --   --  18 19   BILIRUBIN  --   --   --   --  0.4 0.3   ALKPT  --   --   --   --  98 95   INR  --   --   --  1.1  --   --        Assessment & Plan:     CKD stage 5 now with progression to ESRD  CKD likely due to DM and HTN  Patient presented from nephrology clinic with volume overload and for initiation of HD   CXR with evidence of pleural effusion and patient with LLE swelling and scrotal edema consistent with volume overload   Lytes otherwise stable   Had prior admit with MIKAYLA on CKD that required HD but due to evidence of prior renal recovery, further HD was held  Nephrology consulted-recs appreciated-IR for tunneled HD catheter placement and HD initiated on 04/21, plan for HD daily x 3 days for initiation   Patient had R tunneled HD catheter placed by IR on 04/21  CM for outpatient HD chair-pt unable to have outpatient HD set up until 04/27 per    Lymphedema service consulted-appreciate recs and management     Episode of rectal bleeding  Possibly related to hemorrhoids  Hgb remains stable and no further episodes noted  Case reviewed with GI service-given stable hemodynamics and no further GI bleeding, will monitor clinically at this time and plan for outpatient colonoscopy evaluation   If any further bleeding or change in Hgb, will discuss possibility of inpatient colonoscopy with GI service   Monitor H/H and hemodynamics closely      Chronic diastolic heart failure  Resume PTA Bumex and Metolazone   Suspect volume overload is likely related to progression of CKD to ESRD   Monitor I/Os and  daily weight   Fluid removal per nephrology with initiation of HD      Renal osteodystrophy  Resume PTA Calcitriol and phosphate binder  Further management per nephrology     Chronic anemia  Likely due to advanced CKD  No evidence of clinically significant bleeding and Hgb overall stable compared to prior admits   Monitor H/H  Consider initiation of MANDO agents per nephrology     Chronic hypoxic respiratory failure  Patient previously on home O2  Currently O2 requirement is stable at 1L  Will continue supplemental oxygen as needed and wean FiO2 as tolerated     Essential hypertension  Uncontrolled   Continue Coreg, Hydralazine and Amlodipine  Continue PTA Bumex and Metolazone as per nephrology  Will increase PTA Hydralazine dose given elevated BP readings   Will adjust regimen as needed      Chronic left lower extremity ulcers present on admit  Patient follows in wound care clinic  No evidence for superimposed infection and patient without leukocytosis or fever   Wound care consulted-appreciate recs and management      Insulin dependent diabetes mellitus  Controlled, likely due to worsening CKD  Continue Lantus 5 units nightly and sliding scale insulin   Consistent carb diet  Will adjust regimen as needed      Peripheral vascular disease  S/p prior R BKA  Follows in vascular surgery clinic as outpatient  Continue statin and ASA     Hyperlipidemia  Continue PTA statin      Class 3 obesity  BMI 49.17kg/m2  Advise lifestyle modifications      CODE STATUS- Full resuscitation       DVT Prophylaxis- H     Disposition- Continue inpatient care     Delicia Magdaleno DO  Hospitalist  4/24/2021  4:49 PM             78

## 2021-06-14 NOTE — ED PROVIDER NOTE - CPE EDP NEURO NORM
[Never] : never [TextBox_4] : she was not well the first few days after she left the hospital.  She had no appetite and diarrhea.  She was also tired.  She is better regarding the pain in the chest.  The patient still had cough and sputum that got better and the diarrhea resolved.  She went to her PMD and if she can see GI.\par \par No fever, cough.  coughing blood, wheezing,.  She is taking her nebulizer meds but only once a day but the MDI three times a day. her main complain is the pain in the upper abdomen \par \par She is seen by the home nurse and PT at home normal...

## 2021-06-23 NOTE — ED ADULT TRIAGE NOTE - ESI TRIAGE ACUITY LEVEL, MLM
3 ,[email or web address]

## 2022-01-24 NOTE — ED ADULT NURSE NOTE - SUICIDE SCREENING QUESTION 2
[facility]  Hospitalist Progress Note  Jorge Stewart MD 01/24/2022      Reason for Stay (Diagnosis): respiratory failure         Assessment and Plan:      Summary of Stay: Jimmy Otto is a medically complex 72 year old male with a PMH significant for CVA causing hemiplegia and non verbal status, indwelling ordoñez catheter, malnutrition with G tube feedings,  recent COVID infection and DM II who presented with increased work of breathing, hypoxia and increased somnolence found to have hypernatremia, positive COVID-19 swab and chest x-ray showing multifocal pneumonia.  He also has had hematuria with clotting here in the hospital following Ordoñez exchange in the ER.     Specifically, when he arrived he was mildly tachycardic and hypoxic to the point that he required non rebreather at 8 litres which was shortly weaned to 4 litres. Lab work remarkable for sodium 155, chloride 120, creatinine 1.31, normal LFTs, CRP 22.1, lactic acid 2.3, , troponin 10, glucose 299, WBC 9.7 and Hgb 11.6. CXR showed multifocal patchy infiltrates both lungs, UA looks possibly infected but was taken from indwelling ordoñez and sent for culture, blood cultures drawn and CT PE study ordered. He was given Azithromycin, Ceftriaxone and Dexamethasone 6 mg.    Hypernatremia clinically improving.  Has had significant steroid-induced hyperglycemia requiring daily insulin titration.    Tailored abx to levofloxacin on 1/23.    Hypoxia resolved, stopping decadron 1/24.    I discussed vaccination status with his daughter, Clemente.  He had 2 doses of the Pfizer vaccine most recently in March 2021.  She has been trying to get him a booster but due to his mobility issues has had great issues with this.  The reason for this admission was not COVID-19 and he had a home test that was positive and very early January on 1/1.  As such, he is likely close to a month out from his Covid infection which was relatively mild.  I think he would  benefit from getting a booster and due to significant logistical issues that so far have precluded him from getting it as an outpatient his family would like him to get it here which seems very reasonable.  I have ordered this for 1/25/2022.       #Acute hypoxemic respiratory failure: Likely related to COVID-19 infection.  Procalcitonin negative arguing against a bacterial pneumonia but in the setting of grossly abnormal urine we will continue antibiotics at least 1 day more.  -weaned off supplemental oxygen  -CXR shows multifocal infiltrates. Per family positive for COVID on 1/1 (has had one vaccination)  -Troponin negative and BNP normal without evidence of fluid overload  -Stopped azithromycin/zosyn, started levofloxacin on 1/23 for pseudomonas in urine.  -Stop Dexamethasone 1/24 given resolutionof hypoxia and ongoing steroid induced hyperglycemia.  -CT PE study negative for clot but does show multifocal pneumonia in addition to some lobar consolidation which could represent a bacterial pneumonia.  Should be covered w/ above abx.     #Hypernatremia, hyperchloremia: Due to free water deficit related to inability to eat/drink and recent infectious process.  -Tube feeds restarted, nutrition following  -Free water flushes to 150 cc per 4-hour  -stopped IVF.     #Pseudomonas UTI, hematuria.  Suspect hematuria is mostly due to traumatic ordoñez exchange.  Pseudomonas pan-sensitive.  Plan on 7 days of total treatment.  -Ordoñez was exchanged in the ER, appears to have now developed significant clotting/bleeding though this is clearing.  -holding plavix for now pending further improvement.  ?restart in a day or so.  -no longer having clots.  Ok to stop irrigating if draining well.  -Urology signed off.  -Zosyn 1/21 - 1/23,   -levofloxacin 1-23 to present.  -Monitor hemoglobin      #Hx of CVA with hemiplegia  #Dysphagia with G tube placement on 11/1  #Severe malnutrition  #Non verbal status  -Stroke in 9/2021  -Cared for by  "family and home care nurses  -Modified diet with 1:1 supervision if eating  -chronically on Plavix and Amantadine  -restart plavix once hematuria resolves.     #DM II with hyperglycemia  [PTA detemir 10 units in AM and 18 units in PM, Aspart 5 units with each can of Jevity, Metformin 1000 mg BID  -Has been hyperglycemic here in the setting of steroids and initially holding his long-acting insulin  -Titrate detemir to 14 units QAM, 18 U QHS  -3 units aspart scheduled every 4 hours with tube feeds.  Need to hold if tube feeds are stopped for any reason.  -Every 4 hours sliding scale insulin      #CKD III  -Baseline creatinine 1-1.4     #HTN  -Continue PTA Amlodipine, Atenolol, Doxazosin, Losartan on parameters     #HLP  -Continue Atorvastatin     #Depression  -Continue PTA Fluoxetine    Hypokalemia: change to high scale replacement protocol.  Tube feeds restarted.     Social: Lives with family who provide total care  Code: My colleague discussed with POA and they have chosen Full code  VTE prophylaxis: Lovenox  Disposition: Home with family in 1-2 days.        Interval History (Subjective):      titrating insulin  Stopped decadron today  Hematuria clearing.  No clots, but still some blood.  I contacted his daughter, Clemente, for an update.  Discussed vaccination as above.  Continues to be more alert                    Physical Exam:      Last Vital Signs:  BP (!) 146/82 (BP Location: Left arm, Patient Position: Semi-Capellan's)   Pulse 72   Temp 97.1  F (36.2  C) (Temporal)   Resp 18   Ht 1.626 m (5' 4\")   Wt 56.7 kg (124 lb 14.4 oz)   SpO2 93%   BMI 21.44 kg/m          Intake/Output Summary (Last 24 hours) at 1/23/2022 0885  Last data filed at 1/23/2022 0631  Gross per 24 hour   Intake 2841 ml   Output 1575 ml   Net 1266 ml           General:  awake, no acute distress.  HEENT: NC/AT, eyes anicteric  Cardiac: RRR, S1, S2.  No murmurs appreciated.  Pulmonary: Normal chest rise, normal work of breathing.   Abdomen: " soft, non-tender, non-distended.   Extremities: no deformities.  Warm, well perfused.  Skin: no rashes or lesions noted.  Warm and Dry.  Neuro: Non verbal.  Tracks and opens eyes to voice today.  Thumbs up as response to some questions. No clonus or tremor.         Medications:      All current medications were reviewed with changes reflected in problem list.         Data:      All new lab and imaging data was reviewed.   Labs:  Recent Labs   Lab 01/24/22  0949 01/24/22  0630   NA  --  144   POTASSIUM  --  2.9*   CHLORIDE  --  110*   CO2  --  30   ANIONGAP  --  4   * 211*   BUN  --  24   CR  --  0.93   GFRESTIMATED  --  87   ARLETH  --  8.6     Recent Labs   Lab 01/24/22  0630   WBC 8.9   HGB 10.0*   HCT 34.1*   MCV 88         Imaging:   Recent Results (from the past 48 hour(s))   XR Chest Port 1 View    Narrative    XR CHEST PORT 1 VIEW 1/21/2022 7:03 AM    HISTORY: respiratory distress    COMPARISON: 10/19/2021      Impression    IMPRESSION: Multifocal patchy infiltrates in both lungs consistent  with pneumonia. No pleural effusion or pneumothorax. Normal heart  size.    TAVARES BRAY MD         SYSTEM ID:  [id number]   CT Chest Pulmonary Embolism w Contrast    Narrative    CT CHEST PULMONARY EMBOLISM WITH CONTRAST 1/21/2022 10:39 AM    CLINICAL HISTORY: PE suspected, high probability. Recent COVID,  respiratory failure, evaluate pulmonary embolus.    TECHNIQUE: CT angiogram chest during arterial phase injection IV  contrast. 2D and 3D MIP reconstructions were performed by the CT  technologist. Dose reduction techniques were used.     CONTRAST: 61mL Isovue-370    COMPARISON: None.    FINDINGS:  ANGIOGRAM CHEST: Pulmonary arteries are normal caliber and negative  for pulmonary emboli. Thoracic aorta is normal in caliber and negative  for dissection.     LUNGS AND PLEURA: There are patchy bilateral groundglass and  interstitial opacities with focal airspace consolidation in the right  lower lobe. No  pleural effusion or pneumothorax.    MEDIASTINUM/AXILLAE: No thoracic or axillary lymphadenopathy. Thyroid  and esophagus are unremarkable.    CORONARY ARTERY CALCIFICATION: Mild.    UPPER ABDOMEN: A percutaneous gastrostomy tube is noted.    MUSCULOSKELETAL: No destructive bone lesions.      Impression    IMPRESSION:  1.  No evidence of pulmonary embolism.  2.  Patchy bilateral groundglass and interstitial opacities have a  typical appearance of COVID 19. Superimposed airspace consolidation in  the right lower lobe concerning for superimposed bacterial pneumonia.    ALEXYS ORTEGA MD         SYSTEM ID:  [id number]         Alexys Stewart MD       No

## 2022-05-07 NOTE — ED PROVIDER NOTE - NS ED ATTENDING STATEMENT MOD
ambulatory I have personally seen and examined this patient.  I have fully participated in the care of this patient. I have reviewed all pertinent clinical information, including history, physical exam, plan and the Resident’s note and agree except as noted.

## 2022-06-01 ENCOUNTER — EMERGENCY (EMERGENCY)
Facility: HOSPITAL | Age: 72
LOS: 1 days | Discharge: LEFT BEFORE DISCHARGE | End: 2022-06-01
Attending: EMERGENCY MEDICINE | Admitting: EMERGENCY MEDICINE
Payer: MEDICARE

## 2022-06-01 VITALS
RESPIRATION RATE: 16 BRPM | OXYGEN SATURATION: 100 % | HEART RATE: 76 BPM | SYSTOLIC BLOOD PRESSURE: 136 MMHG | DIASTOLIC BLOOD PRESSURE: 66 MMHG

## 2022-06-01 VITALS
RESPIRATION RATE: 17 BRPM | SYSTOLIC BLOOD PRESSURE: 155 MMHG | TEMPERATURE: 98 F | OXYGEN SATURATION: 100 % | DIASTOLIC BLOOD PRESSURE: 60 MMHG | HEART RATE: 75 BPM

## 2022-06-01 LAB
ALBUMIN SERPL ELPH-MCNC: 4.7 G/DL — SIGNIFICANT CHANGE UP (ref 3.3–5)
ALP SERPL-CCNC: 70 U/L — SIGNIFICANT CHANGE UP (ref 40–120)
ALT FLD-CCNC: 24 U/L — SIGNIFICANT CHANGE UP (ref 4–33)
ANION GAP SERPL CALC-SCNC: 12 MMOL/L — SIGNIFICANT CHANGE UP (ref 7–14)
AST SERPL-CCNC: 30 U/L — SIGNIFICANT CHANGE UP (ref 4–32)
BASOPHILS # BLD AUTO: 0.02 K/UL — SIGNIFICANT CHANGE UP (ref 0–0.2)
BASOPHILS NFR BLD AUTO: 0.4 % — SIGNIFICANT CHANGE UP (ref 0–2)
BILIRUB SERPL-MCNC: 0.7 MG/DL — SIGNIFICANT CHANGE UP (ref 0.2–1.2)
BUN SERPL-MCNC: 18 MG/DL — SIGNIFICANT CHANGE UP (ref 7–23)
CALCIUM SERPL-MCNC: 9.9 MG/DL — SIGNIFICANT CHANGE UP (ref 8.4–10.5)
CHLORIDE SERPL-SCNC: 103 MMOL/L — SIGNIFICANT CHANGE UP (ref 98–107)
CO2 SERPL-SCNC: 24 MMOL/L — SIGNIFICANT CHANGE UP (ref 22–31)
CREAT SERPL-MCNC: 0.77 MG/DL — SIGNIFICANT CHANGE UP (ref 0.5–1.3)
EGFR: 82 ML/MIN/1.73M2 — SIGNIFICANT CHANGE UP
EOSINOPHIL # BLD AUTO: 0.01 K/UL — SIGNIFICANT CHANGE UP (ref 0–0.5)
EOSINOPHIL NFR BLD AUTO: 0.2 % — SIGNIFICANT CHANGE UP (ref 0–6)
GLUCOSE SERPL-MCNC: 111 MG/DL — HIGH (ref 70–99)
HCT VFR BLD CALC: 40.3 % — SIGNIFICANT CHANGE UP (ref 34.5–45)
HGB BLD-MCNC: 13.3 G/DL — SIGNIFICANT CHANGE UP (ref 11.5–15.5)
IANC: 3.83 K/UL — SIGNIFICANT CHANGE UP (ref 1.8–7.4)
IMM GRANULOCYTES NFR BLD AUTO: 0.2 % — SIGNIFICANT CHANGE UP (ref 0–1.5)
LYMPHOCYTES # BLD AUTO: 0.92 K/UL — LOW (ref 1–3.3)
LYMPHOCYTES # BLD AUTO: 17.8 % — SIGNIFICANT CHANGE UP (ref 13–44)
MCHC RBC-ENTMCNC: 29.2 PG — SIGNIFICANT CHANGE UP (ref 27–34)
MCHC RBC-ENTMCNC: 33 GM/DL — SIGNIFICANT CHANGE UP (ref 32–36)
MCV RBC AUTO: 88.6 FL — SIGNIFICANT CHANGE UP (ref 80–100)
MONOCYTES # BLD AUTO: 0.37 K/UL — SIGNIFICANT CHANGE UP (ref 0–0.9)
MONOCYTES NFR BLD AUTO: 7.2 % — SIGNIFICANT CHANGE UP (ref 2–14)
NEUTROPHILS # BLD AUTO: 3.83 K/UL — SIGNIFICANT CHANGE UP (ref 1.8–7.4)
NEUTROPHILS NFR BLD AUTO: 74.2 % — SIGNIFICANT CHANGE UP (ref 43–77)
NRBC # BLD: 0 /100 WBCS — SIGNIFICANT CHANGE UP
NRBC # FLD: 0 K/UL — SIGNIFICANT CHANGE UP
PLATELET # BLD AUTO: 196 K/UL — SIGNIFICANT CHANGE UP (ref 150–400)
POTASSIUM SERPL-MCNC: 4.8 MMOL/L — SIGNIFICANT CHANGE UP (ref 3.5–5.3)
POTASSIUM SERPL-SCNC: 4.8 MMOL/L — SIGNIFICANT CHANGE UP (ref 3.5–5.3)
PROT SERPL-MCNC: 7.4 G/DL — SIGNIFICANT CHANGE UP (ref 6–8.3)
RBC # BLD: 4.55 M/UL — SIGNIFICANT CHANGE UP (ref 3.8–5.2)
RBC # FLD: 13 % — SIGNIFICANT CHANGE UP (ref 10.3–14.5)
SODIUM SERPL-SCNC: 139 MMOL/L — SIGNIFICANT CHANGE UP (ref 135–145)
TROPONIN T, HIGH SENSITIVITY RESULT: 7 NG/L — SIGNIFICANT CHANGE UP
TROPONIN T, HIGH SENSITIVITY RESULT: 7 NG/L — SIGNIFICANT CHANGE UP
WBC # BLD: 5.16 K/UL — SIGNIFICANT CHANGE UP (ref 3.8–10.5)
WBC # FLD AUTO: 5.16 K/UL — SIGNIFICANT CHANGE UP (ref 3.8–10.5)

## 2022-06-01 PROCEDURE — 99284 EMERGENCY DEPT VISIT MOD MDM: CPT

## 2022-06-01 RX ORDER — ACETAMINOPHEN 500 MG
650 TABLET ORAL ONCE
Refills: 0 | Status: COMPLETED | OUTPATIENT
Start: 2022-06-01 | End: 2022-06-01

## 2022-06-01 RX ORDER — ONDANSETRON 8 MG/1
4 TABLET, FILM COATED ORAL ONCE
Refills: 0 | Status: COMPLETED | OUTPATIENT
Start: 2022-06-01 | End: 2022-06-01

## 2022-06-01 RX ORDER — LIDOCAINE 4 G/100G
1 CREAM TOPICAL ONCE
Refills: 0 | Status: COMPLETED | OUTPATIENT
Start: 2022-06-01 | End: 2022-06-01

## 2022-06-01 RX ORDER — LIDOCAINE 4 G/100G
1 CREAM TOPICAL
Qty: 3 | Refills: 0
Start: 2022-06-01 | End: 2022-06-03

## 2022-06-01 RX ORDER — MECLIZINE HCL 12.5 MG
25 TABLET ORAL ONCE
Refills: 0 | Status: COMPLETED | OUTPATIENT
Start: 2022-06-01 | End: 2022-06-01

## 2022-06-01 RX ORDER — MECLIZINE HCL 12.5 MG
1 TABLET ORAL
Qty: 15 | Refills: 0
Start: 2022-06-01 | End: 2022-06-05

## 2022-06-01 RX ADMIN — LIDOCAINE 1 PATCH: 4 CREAM TOPICAL at 15:11

## 2022-06-01 RX ADMIN — ONDANSETRON 4 MILLIGRAM(S): 8 TABLET, FILM COATED ORAL at 15:12

## 2022-06-01 RX ADMIN — ONDANSETRON 4 MILLIGRAM(S): 8 TABLET, FILM COATED ORAL at 19:27

## 2022-06-01 RX ADMIN — Medication 650 MILLIGRAM(S): at 15:11

## 2022-06-01 RX ADMIN — Medication 25 MILLIGRAM(S): at 15:11

## 2022-06-01 NOTE — ED ADULT TRIAGE NOTE - CHIEF COMPLAINT QUOTE
Pt c/o ;eft sided shoulder and upper back pain starting 2 days ago. Pt denies injury/trauma. Denies cp.

## 2022-06-01 NOTE — ED PROVIDER NOTE - PHYSICAL EXAMINATION
GENERAL: no acute distress, non-toxic appearing  HEAD: normocephalic, atraumatic  HEENT: PERRLA, EOMI, normal conjunctiva, oral mucosa moist  CARDIAC: regular rate and rhythm, normal S1 and S2, no appreciable murmurs  PULM: clear to ascultation bilaterally, no crackles, rales, rhonchi, or wheezing  GI: abdomen nondistended, soft, nontender  NEURO: alert and oriented x 3, normal speech, no gross neuro deficit  MSK: no visible deformities, pain to palpation of left shoulder and upper arm, pain/weakness of left arm with empty can test  SKIN: no visible rashes, dry, well-perfused  PSYCH: appropriate mood and affect

## 2022-06-01 NOTE — ED PROVIDER NOTE - NSFOLLOWUPINSTRUCTIONS_ED_ALL_ED_FT
You were seen in the ED for shoulder pain and dizziness.    You had labs, ekg. You had a CT scan ordered but you chose to leave without it.    Please follow-up with your primary care doctor.    Please follow-up with the sports medicine clinic regarding your shoulder.    Meclizine (for dizziness), and a lidocaine patch (for shoulder pain) were sent to your pharmacy.    Return to the ED if you have any new or worsening symptoms.

## 2022-06-01 NOTE — ED ADULT NURSE NOTE - OBJECTIVE STATEMENT
Pt received to intake AAO x 4 and ambulatory c/o left shoulder pain x2 days. Pain radiates from posterior left shoulder down through her arm. Also felt more SOB yesterday than usual, requiring use of her inhaler. Vertigo started yesterday, took home Meclizine 12.5 without relief. Has been nauseous, had one episode of vomiting in ED. Denies CP, fevers, numbness/tingling, syncope. Labs sent and pt medicated as per Emar, awaiting CT.

## 2022-06-01 NOTE — ED PROVIDER NOTE - PATIENT PORTAL LINK FT
You can access the FollowMyHealth Patient Portal offered by [facility] by registering at the following website: [email or web address]. By joining [employer]’s FollowMyHealth portal, you will also be able to view your health information using other applications (apps) compatible with our system.

## 2022-06-01 NOTE — ED PROVIDER NOTE - OBJECTIVE STATEMENT
Patient is a 72y F PMHx asthma (on Nucala), vertigo, presenting today with left shoulder pain. Patient states that she has had left shoulder pain x2 days. Pain radiates from posterior left shoulder down through her arm. Also felt more SOB yesterday than usual, requiring use of her inhaler. Vertigo started yesterday, took home Meclizine 12.5 without relief. Has been nauseous, had one episode of vomiting in ED. Denies CP, fevers, numbness/tingling, syncope.

## 2022-06-01 NOTE — ED PROVIDER NOTE - CLINICAL SUMMARY MEDICAL DECISION MAKING FREE TEXT BOX
Patient is a 72y F PMHx asthma (on Nucala), vertigo, presenting today with left shoulder pain. Shoulder pain likely MSK, however with concurrent vertigo, SOB, need to r/o ACS vs PE vs dissection. Patient currently stable, will reassess.

## 2022-06-01 NOTE — ED PROVIDER NOTE - NSFOLLOWUPCLINICS_GEN_ALL_ED_FT
[facility]  Sports Medicine  [address] [place] [address]  Phone: [phone]  Fax:

## 2022-06-01 NOTE — ED PROVIDER NOTE - ATTENDING CONTRIBUTION TO CARE
72 year old with asthma presents with 2 days of left shoulder pain and dizziness she describes as her vertigo. concern for msk pain vs acs vs pe vs vert art dissection vs pna vs pleural effusion vs aortic dissection. labs trop ctpa, pain control, cxr, ekg reassess

## 2022-06-01 NOTE — ED PROVIDER NOTE - PROGRESS NOTE DETAILS
Jadyn Walters MD PGY1: Patient is too nervous to go in the CT scanner. She wants to leave AMA without the CT scan. Multiple attempts were made to convince the patient to attempt to get the CT scan. Despite these efforts, patient would like to leave without the CT scan. She has been made aware of the risks, and wishes to leave. Patient currently with pain relief from lidocaine patch, and relief of dizziness from the meclizine. She was instructed to follow-up with her PCP and with the Sports Medicine Clinic. I attempted to convince patient to use CT scanner using interpretor. patient initially stated she would get ct but then refused.

## 2022-06-28 ENCOUNTER — EMERGENCY (EMERGENCY)
Facility: HOSPITAL | Age: 72
LOS: 1 days | Discharge: LEFT BEFORE DISCHARGE | End: 2022-06-28
Admitting: EMERGENCY MEDICINE

## 2022-06-28 VITALS
RESPIRATION RATE: 18 BRPM | HEART RATE: 85 BPM | OXYGEN SATURATION: 98 % | DIASTOLIC BLOOD PRESSURE: 75 MMHG | SYSTOLIC BLOOD PRESSURE: 140 MMHG | TEMPERATURE: 98 F

## 2022-06-28 PROCEDURE — L9991: CPT

## 2022-07-02 ENCOUNTER — EMERGENCY (EMERGENCY)
Facility: HOSPITAL | Age: 72
LOS: 1 days | Discharge: HOME | End: 2022-07-02
Attending: STUDENT IN AN ORGANIZED HEALTH CARE EDUCATION/TRAINING PROGRAM
Payer: MEDICARE

## 2022-07-02 VITALS
TEMPERATURE: 98 F | WEIGHT: 160.06 LBS | SYSTOLIC BLOOD PRESSURE: 150 MMHG | RESPIRATION RATE: 16 BRPM | OXYGEN SATURATION: 98 % | HEIGHT: 60 IN | DIASTOLIC BLOOD PRESSURE: 74 MMHG | HEART RATE: 67 BPM

## 2022-07-02 LAB
ALBUMIN SERPL ELPH-MCNC: 4.4 G/DL — SIGNIFICANT CHANGE UP (ref 3.3–5)
ALP SERPL-CCNC: 83 U/L — SIGNIFICANT CHANGE UP (ref 40–120)
ALT FLD-CCNC: 19 U/L — SIGNIFICANT CHANGE UP (ref 10–45)
ANION GAP SERPL CALC-SCNC: 12 MMOL/L — SIGNIFICANT CHANGE UP (ref 5–17)
AST SERPL-CCNC: 26 U/L — SIGNIFICANT CHANGE UP (ref 10–40)
BASOPHILS # BLD AUTO: 0.02 K/UL — SIGNIFICANT CHANGE UP (ref 0–0.2)
BASOPHILS NFR BLD AUTO: 0.4 % — SIGNIFICANT CHANGE UP (ref 0–2)
BILIRUB SERPL-MCNC: 0.6 MG/DL — SIGNIFICANT CHANGE UP (ref 0.2–1.2)
BUN SERPL-MCNC: 17 MG/DL — SIGNIFICANT CHANGE UP (ref 7–23)
CALCIUM SERPL-MCNC: 9.9 MG/DL — SIGNIFICANT CHANGE UP (ref 8.4–10.5)
CHLORIDE SERPL-SCNC: 105 MMOL/L — SIGNIFICANT CHANGE UP (ref 96–108)
CO2 SERPL-SCNC: 26 MMOL/L — SIGNIFICANT CHANGE UP (ref 22–31)
CREAT SERPL-MCNC: 1.06 MG/DL — SIGNIFICANT CHANGE UP (ref 0.5–1.3)
EGFR: 56 ML/MIN/1.73M2 — LOW
EOSINOPHIL # BLD AUTO: 0.03 K/UL — SIGNIFICANT CHANGE UP (ref 0–0.5)
EOSINOPHIL NFR BLD AUTO: 0.6 % — SIGNIFICANT CHANGE UP (ref 0–6)
GLUCOSE SERPL-MCNC: 129 MG/DL — HIGH (ref 70–99)
HCT VFR BLD CALC: 38.5 % — SIGNIFICANT CHANGE UP (ref 34.5–45)
HGB BLD-MCNC: 12.6 G/DL — SIGNIFICANT CHANGE UP (ref 11.5–15.5)
LYMPHOCYTES # BLD AUTO: 2 K/UL — SIGNIFICANT CHANGE UP (ref 1–3.3)
LYMPHOCYTES # BLD AUTO: 41 % — SIGNIFICANT CHANGE UP (ref 13–44)
MCHC RBC-ENTMCNC: 29.6 PG — SIGNIFICANT CHANGE UP (ref 27–34)
MCHC RBC-ENTMCNC: 32.7 GM/DL — SIGNIFICANT CHANGE UP (ref 32–36)
MCV RBC AUTO: 90.4 FL — SIGNIFICANT CHANGE UP (ref 80–100)
MONOCYTES # BLD AUTO: 0.46 K/UL — SIGNIFICANT CHANGE UP (ref 0–0.9)
MONOCYTES NFR BLD AUTO: 9.4 % — SIGNIFICANT CHANGE UP (ref 2–14)
NEUTROPHILS # BLD AUTO: 2.37 K/UL — SIGNIFICANT CHANGE UP (ref 1.8–7.4)
NEUTROPHILS NFR BLD AUTO: 48.6 % — SIGNIFICANT CHANGE UP (ref 43–77)
NRBC # BLD: 0 /100 WBCS — SIGNIFICANT CHANGE UP (ref 0–0)
PLATELET # BLD AUTO: 219 K/UL — SIGNIFICANT CHANGE UP (ref 150–400)
POTASSIUM SERPL-MCNC: 3.8 MMOL/L — SIGNIFICANT CHANGE UP (ref 3.5–5.3)
POTASSIUM SERPL-SCNC: 3.8 MMOL/L — SIGNIFICANT CHANGE UP (ref 3.5–5.3)
PROT SERPL-MCNC: 7.4 G/DL — SIGNIFICANT CHANGE UP (ref 6–8.3)
RBC # BLD: 4.26 M/UL — SIGNIFICANT CHANGE UP (ref 3.8–5.2)
RBC # FLD: 13.1 % — SIGNIFICANT CHANGE UP (ref 10.3–14.5)
SODIUM SERPL-SCNC: 143 MMOL/L — SIGNIFICANT CHANGE UP (ref 135–145)
WBC # BLD: 4.88 K/UL — SIGNIFICANT CHANGE UP (ref 3.8–10.5)
WBC # FLD AUTO: 4.88 K/UL — SIGNIFICANT CHANGE UP (ref 3.8–10.5)

## 2022-07-02 PROCEDURE — 99284 EMERGENCY DEPT VISIT MOD MDM: CPT

## 2022-07-02 PROCEDURE — 74176 CT ABD & PELVIS W/O CONTRAST: CPT | Mod: 26,MA

## 2022-07-02 RX ORDER — SODIUM CHLORIDE 9 MG/ML
1000 INJECTION INTRAMUSCULAR; INTRAVENOUS; SUBCUTANEOUS ONCE
Refills: 0 | Status: COMPLETED | OUTPATIENT
Start: 2022-07-02 | End: 2022-07-02

## 2022-07-02 RX ORDER — KETOROLAC TROMETHAMINE 30 MG/ML
15 SYRINGE (ML) INJECTION ONCE
Refills: 0 | Status: DISCONTINUED | OUTPATIENT
Start: 2022-07-02 | End: 2022-07-02

## 2022-07-02 RX ORDER — DIAZEPAM 5 MG
5 TABLET ORAL ONCE
Refills: 0 | Status: DISCONTINUED | OUTPATIENT
Start: 2022-07-02 | End: 2022-07-02

## 2022-07-02 RX ADMIN — Medication 5 MILLIGRAM(S): at 23:23

## 2022-07-02 RX ADMIN — SODIUM CHLORIDE 1000 MILLILITER(S): 9 INJECTION INTRAMUSCULAR; INTRAVENOUS; SUBCUTANEOUS at 22:40

## 2022-07-02 RX ADMIN — Medication 15 MILLIGRAM(S): at 22:40

## 2022-07-02 NOTE — ED PROVIDER NOTE - OBJECTIVE STATEMENT
72y F w/ HTN and asthma presents to the ED c/o R back pain radiating to right flank a/w dysuria and nausea x1wk. Pt was prescribed for cipro x5days for a bladder infection. Finished course pain is still getting worse. Last took Tylenol at 6pm w/o relief. Denies hematuria, f/c, CP, SOB, vomiting, bloody stool, vaginal bleeding/discharge. Describes similar event in Feb, pt went to urologist had a procedure done and the pain went away. Denies hx of kidney stones. Last BM was at 5pm today. 72y F w/ HTN and asthma presents to the ED c/o R back pain radiating to right flank a/w dysuria and nausea x1wk. Pt was prescribed for cipro x5days for a bladder infection by pcp. Finished course pain is still getting worse. no urinary frequency. Last took Tylenol at 6pm w/o relief. Denies hematuria, f/c, CP, SOB, vomiting, bloody stool, vaginal bleeding/discharge. Describes similar event in Feb, pt went to urologist had a procedure done and the pain went away. Denies hx of kidney stones. Last BM was at 5pm today regular. 72y F w/ HTN and asthma presents to the ED c/o R back pain radiating to right groin a/w dysuria and nausea x1wk. Pt was prescribed for cipro x5days for a bladder infection by pcp. Finished course pain is still getting worse. no urinary frequency. Last took Tylenol at 6pm w/o relief. Denies hematuria, f/c, CP, SOB, vomiting, bloody stool, vaginal bleeding/discharge. Describes similar event in Feb, pt went to urologist had a procedure done and the pain went away. Denies hx of kidney stones. Last BM was at 5pm today regular.

## 2022-07-02 NOTE — ED ADULT NURSE NOTE - OBJECTIVE STATEMENT
73 y/o F A&Ox3 PMH asthma, HTN denies pertinent PSH presents to the ED from home c/o RLQ abdominal pain. Pt endorses constant RLQ pain radiating to her right back x7 days. Pt taking antibiotics as prescribed outpatient with little to no relief. 73 y/o F A&Ox3 PMH asthma, HTN denies pertinent PSH presents to the ED from home c/o RLQ abdominal pain. Pt endorses constant RLQ pain radiating to her right back with nausea x7 days. Pt taking antibiotics as prescribed outpatient with little to no relief. Upon ED arrival pt is well appearing. Breathing is even and unlabored. Skin is warm, dry & in tact. Abdomen is soft, non-distended. Denies CP, SOB, HA, fevers, chills, vision changes. IV access obtained. Comfort & safety provided.

## 2022-07-02 NOTE — ED ADULT TRIAGE NOTE - CHIEF COMPLAINT QUOTE
suprapubic abd pain radiating to right flank. Pt was prescribed cipro for a bladder infection. Finished course pain is still getting worse. Pain after urination, but urine "comes out normal". minimal relief with advil and tylenol.

## 2022-07-02 NOTE — ED PROVIDER NOTE - NSICDXPASTMEDICALHX_GEN_ALL_CORE_FT
PAST MEDICAL HISTORY:  Asthma     HTN (hypertension)        PAST MEDICAL HISTORY:  Asthma     HTN (hypertension)

## 2022-07-02 NOTE — ED PROVIDER NOTE - NSICDXFAMHXNEG_GEN_ED
How Severe Are Your Spot(S)?: mild What Is The Reason For Today's Visit?: Full Body Skin Examination What Is The Reason For Today's Visit? (Being Monitored For X): concerning skin lesions on an annual basis .

## 2022-07-02 NOTE — ED PROVIDER NOTE - PATIENT PORTAL LINK FT
You can access the FollowMyHealth Patient Portal offered by [facility] by registering at the following website: [email or web address]. By joining [employer]’s FollowMyHealth portal, you will also be able to view your health information using other applications (apps) compatible with our system. You can access the FollowMyHealth Patient Portal offered by [facility] by registering at the following website: [email or web address]. By joining [employer]’s FollowMyHealth portal, you will also be able to view your health information using other applications (apps) compatible with our system.

## 2022-07-02 NOTE — ED PROVIDER NOTE - CLINICAL SUMMARY MEDICAL DECISION MAKING FREE TEXT BOX
73 yo F p/w 1 week of dysuria R sided flank pain. finished 5 days cipro. no hematuria f.c. VSS. + CVA R sided. c/f stone vs uti. lower c/f for pyelo or pelvic pathology. will get labs ua uc CT abd/pel give ivf analgesia and r.a Dermal Autograft Text: The defect edges were debeveled with a #15 scalpel blade.  Given the location of the defect, shape of the defect and the proximity to free margins a dermal autograft was deemed most appropriate.  Using a sterile surgical marker, the primary defect shape was transferred to the donor site. The area thus outlined was incised deep to adipose tissue with a #15 scalpel blade.  The harvested graft was then trimmed of adipose and epidermal tissue until only dermis was left.  The skin graft was then placed in the primary defect and oriented appropriately. 73 yo F p/w 1 week of dysuria R sided flank pain. finished 5 days cipro. no hematuria f/c. VSS. + CVA R sided. c/f stone vs uti. lower c/f for pyelo or pelvic pathology. will get labs ua uc CT abd/pel give ivf analgesia and r.a

## 2022-07-02 NOTE — ED PROVIDER NOTE - CHPI ED SYMPTOMS NEG
CP, vaginal bleeding, vaginal discharge./no blood in stool/no fever/no hematuria/no vomiting/no chills no blood in stool/no fever/no hematuria/no vomiting/no chills

## 2022-07-02 NOTE — ED PROVIDER NOTE - PROGRESS NOTE DETAILS
Gricelda Eagle (MD): On exam well appearing, abd soft, mild suprapubic and rlq ttp no cva tenderness radial pulses equal and strong concerned for renal stone vs plyo lower óscar appy no concern for aortic pathology at this time labs ct urine iv fluids and r/a. currently does not need any pain medication or antiemetics. Gricelda Eagle): 72y F w/ HTN and asthma presents to the ED c/o R back pain radiating to right flank a/w dysuria and nausea x1wk. Pt was prescribed for cipro x5days for a bladder infection by pcp. Finished course pain is still getting worse. no urinary frequency. Last took Tylenol at 6pm w/o relief. Denies hematuria, f/c, CP, SOB, vomiting, bloody stool, vaginal bleeding/discharge. Describes similar event in Feb, pt went to urologist had a procedure done and the pain went away. Denies hx of kidney stones. Last BM was at 5pm today regular.  On exam well appearing, abd soft, mild suprapubic and rlq ttp, no cva tenderness, radial pulses equal and strong. Concerned for renal stone vs pyelo lower suspicion appy. No concern for aortic pathology at this time. Will get labs, ct, urine, iv fluids, and r/a. Currently does not need any pain medication or antiemetics. patient required valium for ct scan Leeroy ANDERSON PGY2: labs and imaging negative. pain improved with meds. likely 2/2 uti partially treated. will prescribe Macrobid with pcp f.u and return precautions.

## 2022-07-02 NOTE — ED PROVIDER NOTE - ATTENDING CONTRIBUTION TO CARE
I, Gricelda Eagle, performed a history and physical exam of the patient and discussed their management with the resident and /or advanced care provider. I reviewed the resident and /or ACP's note and agree with the documented findings and plan of care except where otherwise noted in my note. I was present and available for all procedures.     Gricelda Eagle (MD): 72y F w/ HTN and asthma presents to the ED c/o R back pain radiating to right flank a/w dysuria and nausea x1wk. Pt was prescribed for cipro x5days for a bladder infection by pcp. Finished course pain is still getting worse. no urinary frequency. Last took Tylenol at 6pm w/o relief. Denies hematuria, f/c, CP, SOB, vomiting, bloody stool, vaginal bleeding/discharge. Describes similar event in Feb, pt went to urologist had a procedure done for hematuria and the pain went away (?lithotripsy, unknown exact procedure). Denies hx of kidney stones. Last BM was at 5pm today regular.  On exam well appearing, abd soft, mild suprapubic and rlq ttp, no cva tenderness, radial pulses equal and strong. Concerned for renal stone vs pyelo lower suspicion appy. No concern for aortic pathology at this time. Will get labs, ct, urine, iv fluids, and r/a. Currently does not need any pain medication or antiemetics.

## 2022-07-02 NOTE — ED PROVIDER NOTE - PHYSICAL EXAMINATION
Gen: NAD, non-toxic appearing  Head: normal appearing  HEENT: normal conjunctiva, oral mucosa moist  Lung: no respiratory distress, speaking in full sentences, CTA b/l     CV: regular rate and rhythm, no murmurs  Abd: soft, non distended, non tender minimal suprapubic tenderness R    MSK: + CVA R sided   Neuro: No focal deficits, AAOx3  Skin: Warm  Psych: normal affect PHYSICAL EXAM:   General: well-appearing, appears stated age, not in extremis   HEENT: NC/AT, airway patent  Cardiovascular: regular rate   Respiratory: nonlabored respirations, saturating well on RA, speaking in full sentences  Abdominal: soft, suprapubic and rlq ttp with mild guarding, nondistended, no rigidity  Back: no costovertebral tenderness, no rashes noted  Extremities: no LE edema b/l. Radial pulses equal and strong b/l  Neuro: Alert and oriented x3.   Psychiatric: appropriate mood and affect.   -Gricelda Eagle MD Attending Physician

## 2022-07-03 VITALS
DIASTOLIC BLOOD PRESSURE: 87 MMHG | HEART RATE: 69 BPM | RESPIRATION RATE: 18 BRPM | OXYGEN SATURATION: 98 % | TEMPERATURE: 98 F | SYSTOLIC BLOOD PRESSURE: 161 MMHG

## 2022-07-03 LAB
APPEARANCE UR: CLEAR — SIGNIFICANT CHANGE UP
BILIRUB UR-MCNC: NEGATIVE — SIGNIFICANT CHANGE UP
COLOR SPEC: SIGNIFICANT CHANGE UP
CULTURE RESULTS: NO GROWTH — SIGNIFICANT CHANGE UP
DIFF PNL FLD: NEGATIVE — SIGNIFICANT CHANGE UP
GLUCOSE UR QL: NEGATIVE — SIGNIFICANT CHANGE UP
KETONES UR-MCNC: NEGATIVE — SIGNIFICANT CHANGE UP
LEUKOCYTE ESTERASE UR-ACNC: NEGATIVE — SIGNIFICANT CHANGE UP
NITRITE UR-MCNC: NEGATIVE — SIGNIFICANT CHANGE UP
PH UR: 6 — SIGNIFICANT CHANGE UP (ref 5–8)
PROT UR-MCNC: NEGATIVE — SIGNIFICANT CHANGE UP
SP GR SPEC: 1.01 — SIGNIFICANT CHANGE UP (ref 1.01–1.02)
SPECIMEN SOURCE: SIGNIFICANT CHANGE UP
UROBILINOGEN FLD QL: NEGATIVE — SIGNIFICANT CHANGE UP

## 2022-07-03 PROCEDURE — 74176 CT ABD & PELVIS W/O CONTRAST: CPT | Mod: MA

## 2022-07-03 PROCEDURE — 99284 EMERGENCY DEPT VISIT MOD MDM: CPT | Mod: 25

## 2022-07-03 PROCEDURE — 81003 URINALYSIS AUTO W/O SCOPE: CPT

## 2022-07-03 PROCEDURE — 80053 COMPREHEN METABOLIC PANEL: CPT

## 2022-07-03 PROCEDURE — 96375 TX/PRO/DX INJ NEW DRUG ADDON: CPT

## 2022-07-03 PROCEDURE — 87086 URINE CULTURE/COLONY COUNT: CPT

## 2022-07-03 PROCEDURE — 96374 THER/PROPH/DIAG INJ IV PUSH: CPT

## 2022-07-03 PROCEDURE — 36415 COLL VENOUS BLD VENIPUNCTURE: CPT

## 2022-07-03 PROCEDURE — 85025 COMPLETE CBC W/AUTO DIFF WBC: CPT

## 2022-07-03 RX ORDER — SODIUM CHLORIDE 9 MG/ML
1000 INJECTION INTRAMUSCULAR; INTRAVENOUS; SUBCUTANEOUS ONCE
Refills: 0 | Status: COMPLETED | OUTPATIENT
Start: 2022-07-03 | End: 2022-07-03

## 2022-07-03 RX ORDER — ACETAMINOPHEN 500 MG
1000 TABLET ORAL ONCE
Refills: 0 | Status: COMPLETED | OUTPATIENT
Start: 2022-07-03 | End: 2022-07-03

## 2022-07-03 RX ORDER — NITROFURANTOIN MACROCRYSTAL 50 MG
1 CAPSULE ORAL
Qty: 10 | Refills: 0
Start: 2022-07-03 | End: 2022-07-07

## 2022-07-03 RX ORDER — PHENAZOPYRIDINE HCL 100 MG
200 TABLET ORAL ONCE
Refills: 0 | Status: COMPLETED | OUTPATIENT
Start: 2022-07-03 | End: 2022-07-03

## 2022-07-03 RX ORDER — ONDANSETRON 8 MG/1
4 TABLET, FILM COATED ORAL ONCE
Refills: 0 | Status: COMPLETED | OUTPATIENT
Start: 2022-07-03 | End: 2022-07-03

## 2022-07-03 RX ADMIN — ONDANSETRON 4 MILLIGRAM(S): 8 TABLET, FILM COATED ORAL at 05:52

## 2022-07-03 RX ADMIN — Medication 400 MILLIGRAM(S): at 04:44

## 2022-07-03 RX ADMIN — Medication 200 MILLIGRAM(S): at 04:42

## 2022-07-03 RX ADMIN — SODIUM CHLORIDE 1000 MILLILITER(S): 9 INJECTION INTRAMUSCULAR; INTRAVENOUS; SUBCUTANEOUS at 04:57

## 2023-03-02 NOTE — ED ADULT NURSE NOTE - NS ED NOTE  TALK SOMEONE YN
[FreeTextEntry1] : FIDEL THOMAS is a 62 year old woman with PsA. 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 Some insomnia and sweats with prednisone, otherwise no SE, some GI upset but mild with MTX, otherwise tolerating it well. No infectious sx, rash improved, no extra-articular manifestations. \par \par 2/11/22 -- Ongoing improvement in joints but still with activity, L knee posterior fullness and pain is most active. She is amenable to trying to taper steroids regardless as is worried about long term use. No extra-articular manifestations, no infectious sx. \par \par 3/25/22 -- Worsening of symmetrical joint pain and warmth, some b/l SI joint and L hip pain with prolonged AM stiffness and improved with exercise and not typical of her DJD related pain. Rash has not emerged but increased sensitivity over sites of prior rash. Ongoing dry eye but no inflammation. No SE with MTX, no infectious sx, above is worse since tapering to prednisone 10mg/day.  \par \par 6/24/22 -- Recent L knee nondisplaced tibial plateau fracture/ ACL sprain, slowly improving. Improvement in psoriasis, ongoing small and large joint arthralgias tho less overt swelling/warmth. Some positional RUE tingling, mild C spine pain. No CP/SOB, GI/ sx, no infectious sx. \par \par 8/26/22 -- Rash is flaring again, returned to b/l feet, now on elbows, on scalp. Ongoing small symmetrical small joint arthralgias with all day stiffness. L knee fx is healed, now getting gel injections in L knee, considering R as well if L goes well. + C spine pain with RUE radiation, positional at present. \par \par 11/18/22 -- Recent worsening of arthralgias with weather change, spine is the worst. Areas of rash on R foot that was responsive to Humira has recurred on Stelara, scalp also more itchy. S/p gel injections to b/l knees with improvement. Ongoing, somewhat worse fatigue but still able to do ADLs. Remainder of extra-articular inflammatory ROS negative, no infectious sx. \par \par 1/20/23 -- Rash not improved but stabilized, ongoing arthralgias/stiffness but no synovitis, no SE with Stelara. R thumb CMC OA related pain which is making use difficult. Fatigue may be somewhat improved, no extra-articular inflammatory sx. 
no

## 2024-02-16 NOTE — ED PROVIDER NOTE - PLAN OF CARE
1) Please follow-up with your primary care doctor within the next 3 days.  Please call today or tomorrow for an appointment.  If you cannot follow-up with your doctor(s), please return to the ED for any urgent issues.  2) If you have any worsening of symptoms or any other concerns please return to the ED immediately.  3) Please continue taking your home medications as directed.  4) You may have been given a copy of your labs and/or imaging.  Please go over these with your primary care doctor.
Low

## 2024-06-21 NOTE — ED ADULT TRIAGE NOTE - RESPIRATORY RATE (BREATHS/MIN)
Problem: Breathing Pattern Ineffective  Goal: Air exchange is effective, demonstrated by Sp02 sat of greater then or = 92% (or as ordered)  Outcome: Monitoring/Evaluating progress  Goal: Respiratory pattern is quiet and regular without report of SOB  Outcome: Monitoring/Evaluating progress  Goal: Breathing pattern demonstrates minimal apnea during sleep with appropriate use of airway pressure support devices  Outcome: Monitoring/Evaluating progress  Goal: Verbalizes/demonstrates effective breathing management strategies  Description: Document education using the patient education activity.   Outcome: Monitoring/Evaluating progress     Problem: Pneumonia  Goal: S/S of acute pneumonia are resolved  Description: If acute pneumonia is present, monitor for resolution of fever, cough, secretions and other test values based on presentation.  Outcome: Monitoring/Evaluating progress  Goal: Verbalizes understanding of pneumonia, treatment, and ongoing prevention  Description: Document on Patient Education Activity  Outcome: Monitoring/Evaluating progress     Problem: At Risk for Falls  Goal: Patient does not fall  Outcome: Monitoring/Evaluating progress  Goal: Patient takes action to control fall-related risks  Outcome: Monitoring/Evaluating progress     Problem: At Risk for Injury Due to Fall  Goal: Patient does not fall  Outcome: Monitoring/Evaluating progress  Goal: Takes action to control condition specific risks  Outcome: Monitoring/Evaluating progress  Goal: Verbalizes understanding of fall-related injury personal risks  Description: Document education using the patient education activity  Outcome: Monitoring/Evaluating progress      18

## 2025-02-25 ENCOUNTER — EMERGENCY (EMERGENCY)
Facility: HOSPITAL | Age: 75
LOS: 1 days | Discharge: LEFT BEFORE DISCHARGE | End: 2025-02-25
Admitting: EMERGENCY MEDICINE
Payer: MEDICARE

## 2025-02-25 VITALS
SYSTOLIC BLOOD PRESSURE: 154 MMHG | HEART RATE: 62 BPM | DIASTOLIC BLOOD PRESSURE: 77 MMHG | OXYGEN SATURATION: 97 % | RESPIRATION RATE: 18 BRPM | WEIGHT: 162.92 LBS | TEMPERATURE: 98 F

## 2025-02-25 PROCEDURE — L9991: CPT

## 2025-02-25 NOTE — ED ADULT TRIAGE NOTE - CHIEF COMPLAINT QUOTE
Pt ambulatory with steady gait c/o frontal headache since Friday. Pt reports nausea today. C/o intermittent dizziness. Pt prescribed antibiotics for sinus infection by PCP with no relief. Phx asthma